# Patient Record
Sex: FEMALE | Race: WHITE | NOT HISPANIC OR LATINO | Employment: FULL TIME | URBAN - METROPOLITAN AREA
[De-identification: names, ages, dates, MRNs, and addresses within clinical notes are randomized per-mention and may not be internally consistent; named-entity substitution may affect disease eponyms.]

---
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
## 2018-01-23 ENCOUNTER — TRANSFERRED RECORDS (OUTPATIENT)
Dept: HEALTH INFORMATION MANAGEMENT | Facility: CLINIC | Age: 18
End: 2018-01-23

## 2018-01-23 LAB — PHQ9 SCORE: 12

## 2018-04-02 ENCOUNTER — TELEPHONE (OUTPATIENT)
Dept: FAMILY MEDICINE | Facility: CLINIC | Age: 18
End: 2018-04-02

## 2018-04-02 DIAGNOSIS — F41.9 ANXIETY: ICD-10-CM

## 2018-04-02 DIAGNOSIS — F32.1 MAJOR DEPRESSIVE DISORDER, SINGLE EPISODE, MODERATE (H): ICD-10-CM

## 2018-04-02 NOTE — TELEPHONE ENCOUNTER
Patient is requesting Dr Miller fill a script previously written by Dr Angie Perez = Prozac 40mg, 1qam.      Vanessa [facility]    Pharmacy Technician  [facility]

## 2018-04-02 NOTE — LETTER
April 4, 2018    Sisi Valladares  [address] [address]    Dear Sisi,       We recently received a refill request for FLUoxetine (PROZAC) 40 MG capsule.  We have refilled this for a one time 30 day supply only because you are due for a:    Anxiety/depression office visit      Please call at your earliest convenience so that there will not be a delay with your future refills.          Thank you,   Your [facility] Team/  [phone]

## 2018-04-02 NOTE — TELEPHONE ENCOUNTER
Patient is calling would like to discuss her medications and see if provider would prescribe a medication she got from hospital, patient would not disclose further. Please call to discuss. Thank you.

## 2018-04-02 NOTE — TELEPHONE ENCOUNTER
Left message on answering machine for patient to call back to [phone].  Beatrice Brothers RN

## 2018-04-03 PROBLEM — F41.9 ANXIETY: Status: ACTIVE | Noted: 2018-04-03

## 2018-04-03 RX ORDER — HYDROXYZINE PAMOATE 50 MG/1
50 CAPSULE ORAL
COMMUNITY
Start: 2018-01-25 | End: 2019-06-26

## 2018-04-03 RX ORDER — HYDROXYZINE HYDROCHLORIDE 25 MG/1
50-100 TABLET, FILM COATED ORAL 2 TIMES DAILY PRN
Qty: 60 TABLET | Refills: 0 | Status: SHIPPED | OUTPATIENT
Start: 2018-04-03 | End: 2019-06-26

## 2018-04-03 RX ORDER — TRAZODONE HYDROCHLORIDE 50 MG/1
50 TABLET, FILM COATED ORAL
COMMUNITY
Start: 2018-01-25 | End: 2019-06-26

## 2018-04-03 RX ORDER — FLUOXETINE 40 MG/1
40 CAPSULE ORAL DAILY
Qty: 30 CAPSULE | Refills: 0 | Status: SHIPPED | OUTPATIENT
Start: 2018-04-03 | End: 2019-06-26

## 2018-04-03 RX ORDER — FLUOXETINE 40 MG/1
40 CAPSULE ORAL
COMMUNITY
Start: 2018-01-26 | End: 2019-06-26

## 2018-04-03 NOTE — TELEPHONE ENCOUNTER
Patient prescribed hydroxyzine in hospital for anxiety and helps would like prescription to continue. Patient dose not know dose.  Patient was at [facility] in January for depression and anxiety.  Patient is not completely out, has a few left, therapist wants patient to continue med.    meds reconciled from care everywhere    To provider to advise.  .Joaquina TUCKER, RN, CPN

## 2018-04-04 NOTE — TELEPHONE ENCOUNTER
Pt notified of provider message as written.  Pt verbalized good understanding.  Beatrice Brothers RN

## 2018-04-17 ENCOUNTER — TRANSFERRED RECORDS (OUTPATIENT)
Dept: HEALTH INFORMATION MANAGEMENT | Facility: CLINIC | Age: 18
End: 2018-04-17

## 2018-04-17 LAB — PHQ9 SCORE: 21

## 2018-05-09 ENCOUNTER — PATIENT OUTREACH (OUTPATIENT)
Dept: CARE COORDINATION | Facility: CLINIC | Age: 18
End: 2018-05-09

## 2018-05-09 ENCOUNTER — TELEPHONE (OUTPATIENT)
Dept: FAMILY MEDICINE | Facility: CLINIC | Age: 18
End: 2018-05-09

## 2018-05-09 DIAGNOSIS — F41.9 ANXIETY: Primary | ICD-10-CM

## 2018-05-09 DIAGNOSIS — F32.A DEPRESSION: ICD-10-CM

## 2018-05-09 NOTE — LETTER
Health Care Home - Access Care Plan    About Me  Patient Name:  Sisi Valladaers    YOB: 2000  Age:                             17 year old   Apoorva MRN:            [id number] Telephone Information:     Home Phone [phone]   Mobile [phone]       [address]    [address] [facility] [address] Email address:  No e-mail address on record      Emergency Contact(s)  Name Relationship Lgl Grd Work Phone Home Phone Mobile Phone   1HEATHER JAUREGUI Father   [phone]             Health Maintenance:      My Access Plan  Medical Emergency 911   Questions or concerns during clinic hours Primary Clinic Line, I will call the clinic directly: [facility] - [phone]   24 Hour Appointment Line [phone] or  [phone]) (toll free)   24 Hour Nurse Line [phone] (toll free)   Questions or concerns outside clinic hours 24 Hour Appointment Line, I will call the after-hours on-call line:   [facility] [phone] or [phone]) (toll-free)   Preferred Urgent Care [facility], [phone]   Preferred Hospital [facility]  [phone]   Preferred Pharmacy [facility] [address], [address]     Behavioral Health Crisis Line The National Suicide Prevention Lifeline at 1-459.990.6622 or 911     My Care Team Members  Patient Care Team       Relationship Specialty Notifications Start End    Choco Miller MD PCP - General Family Practice  4/9/16     Phone: [phone] Fax: [phone] [address] [facility] [address]           My Medical and Care Information  Problem List   Patient Active Problem List   Diagnosis     Congenital hypothyroidism without goiter     Major depressive disorder, single episode, moderate (H)     Common wart     Depression     Anxiety      Current Medications and Allergies:  See printed Medication Report

## 2018-05-09 NOTE — LETTER
[facility]  [address] [facility] [address] [phone]      May 9, 2018      Sisi Valladares  [address] [facility] [address]        Dear Sisi Valladares    Your health is important to us and we missed you at your recent appointment. Please call us if you need to reschedule your appointment for another date and time.   Any time you are unable to keep your appointment we kindly ask that you call us at least two hours in advance to let us know. This will allow us to offer the time to another patient.  Our records show that this is the (second or third) appointment in the last six months that has been missed or canceled without a phone call to the clinic. Our policy allows no more than three missed appointments in a period of six months. A fourth no-show will result in our ending your care at our clinic.     If you did not attend your appointment because of concerns over your ability to pay for care, please contact me so we can discuss payment options.   If you have any questions regarding this notice, please contact me at [phone]  Sincerely,           [facility] Clinic Administrator/Manager

## 2018-05-09 NOTE — PROGRESS NOTES
Clinic Care Coordination Contact  [facility]/Voicemail    Referral Source: PCP  Clinical Data: Care Coordinator Outreach  Outreach attempted x 1.  Unable to leave message as mailbox is full.   Plan: Care Coordinator will mail out care coordination introduction letter with care coordinator contact information and explanation of care coordination services. Care Coordinator will try to reach patient again in 1-2 business days.  Martin De La Cruz RN  Clinic Care Coordinator

## 2018-05-09 NOTE — LETTER
[place] CARE COORDINATION   [facility]:   [address] [place] [address]  [phone]    May 9, 2018    Sisi Valladares  [address] [facility] [address]      Dear Sisi,    I am a clinic care coordinator who works with JOSLYN GUERRA MD at [facility]. I recently tried to call and was unable to reach you. I wanted to introduce myself and provide you with my contact information so that you can call me with questions or concerns about your health care. Below is a description of clinic care coordination and how I can further assist you.     The clinic care coordinator is a registered nurse and/or  who understand the health care system. The goal of clinic care coordination is to help you manage your health and improve access to the [facility] [place] system in the most efficient manner. The registered nurse can assist you in meeting your health care goals by providing education, coordinating services, and strengthening the communication among your providers. The  can assist you with financial, behavioral, psychosocial, chemical dependency, counseling, and/or psychiatric resources.    Please feel free to contact me at [phone], [phone], with any questions or concerns. We at [facility] are focused on providing you with the highest-quality healthcare experience possible and that all starts with you.     Sincerely,     Martin De La Cruz RN  Clinic Care Coordinator    Enclosed: I have enclosed a copy of a 24 Hour Access Plan. This has helpful phone numbers for you to call when needed. Please keep this in an easy to access place to use as needed.

## 2018-05-11 NOTE — PROGRESS NOTES
Clinic Care Coordination Contact  [facility]/Voicemail    Referral Source: PCP  Clinical Data: Care Coordinator Outreach  Outreach attempted x 2.  Unable to leave message as mailbox is full.   Plan: Care Coordinator mailed out care coordination introduction letter on 5-9-18. Care Coordinator will do no further outreaches at this time.  Martin De La Cruz RN  Clinic Care Coordinator  [phone] or [phone]

## 2019-04-05 ENCOUNTER — TRANSFERRED RECORDS (OUTPATIENT)
Dept: HEALTH INFORMATION MANAGEMENT | Facility: CLINIC | Age: 19
End: 2019-04-05

## 2019-06-26 ENCOUNTER — OFFICE VISIT (OUTPATIENT)
Dept: FAMILY MEDICINE | Facility: CLINIC | Age: 19
End: 2019-06-26
Payer: COMMERCIAL

## 2019-06-26 VITALS
WEIGHT: 138 LBS | SYSTOLIC BLOOD PRESSURE: 107 MMHG | HEIGHT: 61 IN | TEMPERATURE: 98 F | DIASTOLIC BLOOD PRESSURE: 70 MMHG | BODY MASS INDEX: 26.06 KG/M2 | HEART RATE: 76 BPM | OXYGEN SATURATION: 98 %

## 2019-06-26 DIAGNOSIS — E03.1 CONGENITAL HYPOTHYROIDISM WITHOUT GOITER: ICD-10-CM

## 2019-06-26 PROCEDURE — 99213 OFFICE O/P EST LOW 20 MIN: CPT | Performed by: FAMILY MEDICINE

## 2019-06-26 RX ORDER — LEVOTHYROXINE SODIUM 112 UG/1
112 TABLET ORAL DAILY
Qty: 90 TABLET | Refills: 3 | Status: SHIPPED | OUTPATIENT
Start: 2019-06-26 | End: 2020-11-23

## 2019-06-26 ASSESSMENT — ANXIETY QUESTIONNAIRES
1. FEELING NERVOUS, ANXIOUS, OR ON EDGE: SEVERAL DAYS
5. BEING SO RESTLESS THAT IT IS HARD TO SIT STILL: SEVERAL DAYS
GAD7 TOTAL SCORE: 9
IF YOU CHECKED OFF ANY PROBLEMS ON THIS QUESTIONNAIRE, HOW DIFFICULT HAVE THESE PROBLEMS MADE IT FOR YOU TO DO YOUR WORK, TAKE CARE OF THINGS AT HOME, OR GET ALONG WITH OTHER PEOPLE: SOMEWHAT DIFFICULT
7. FEELING AFRAID AS IF SOMETHING AWFUL MIGHT HAPPEN: NOT AT ALL
3. WORRYING TOO MUCH ABOUT DIFFERENT THINGS: SEVERAL DAYS
2. NOT BEING ABLE TO STOP OR CONTROL WORRYING: SEVERAL DAYS
6. BECOMING EASILY ANNOYED OR IRRITABLE: NEARLY EVERY DAY

## 2019-06-26 ASSESSMENT — PATIENT HEALTH QUESTIONNAIRE - PHQ9
5. POOR APPETITE OR OVEREATING: MORE THAN HALF THE DAYS
SUM OF ALL RESPONSES TO PHQ QUESTIONS 1-9: 15

## 2019-06-26 ASSESSMENT — MIFFLIN-ST. JEOR: SCORE: 1329.37

## 2019-06-26 NOTE — PROGRESS NOTES
HPI:    Sisi is a 18 year old female, here for follow-up:    Non compliance - she tells me she is ready to take care of her health.    Depression - Her psychiatric symptoms started many years ago and were low mood, low energy, low self worth, sleeping too much (10 hours per night), fatigue. She has previously thought of CO poisoning. She has previously cut her arms. She has also taken overdose of her thyroid medication to kill herself. She denies current SI and contracts for safety. She seems to have good family support from grandma and dad. In addition she has anxiety and rare panic attacks. Denies symptoms of [place] now or in the past.   Evaluation and treatment:    she was hospitalized due to suicide thoughts 4/8/16 to 4/14/16.    She had MMPI done which showed introverted person who mistrusts others (including mental health providers), feels that she does not fit in, with few coping mechanisms who is at risk for alcohol or drug  abuse. There was also mention of family conflict.   Zoloft and Prozac did not work.   Hydroxyzine helped with anxiety but causes drowsiness - not taking at this time.   She previously followed with therapist.   She does not want treatment until her thyroid is addressed - see below.    PHQ-9 SCORE 7/25/2017 12/14/2017 6/26/2019   PHQ-9 Total Score 14 9 15       BRISEIDA-7 SCORE 7/25/2017 12/14/2017 6/26/2019   Total Score 8 14 9       Congenital hypothyroidism - she had been following with pediatric endocrinology at [facility] all her life. But she is now following with us. She denies any thyroid symptoms like hair, nail changes, temperature intolerance, weight changes etc.  Evaluation and treatment:    Synthroid 112 micrograms daily - no side effects. But has not taken in a long time.   I asked her to restart it. Recheck in 6 weeks.    TSH   Date Value Ref Range Status   12/14/2017 3.04 0.40 - 4.00 mU/L Final       Low back and upper back - Previously referred to surgeon for breast  reduction to help with the back pain.    THC - she quit at the start of April 2016. She did it because everyone was asking her to do so. But she never saw it as a problem. I commended her.    Right thumb wart - we froze it before without success. Then injection worked.     Previously Overweight -   Evaluation and treatment:   Diet and exercise discussed.    Body mass index is 26.29 kg/m .      Wt Readings from Last 5 Encounters:   06/26/19 62.6 kg (138 lb)   12/14/17 67.1 kg (148 lb) (80 %)*   10/16/17 64.4 kg (142 lb) (74 %)*   07/25/17 70.3 kg (155 lb) (86 %)*   12/09/16 67.1 kg (148 lb) (82 %)*     * Growth percentiles are based on [facility] (Girls, 2-20 Years) data.       Preventive - Flu shot: declined previously.     Immunization History   Administered Date(s) Administered     DTAP (<7y) 06/29/2004     DTaP, Unspecified 02/22/1999, 06/02/1999, 07/21/1999, 08/03/2000     HepB, Unspecified 02/22/1999, 06/02/1999, 07/21/1999     Hib (PRP-T) 02/22/1999, 06/02/1999, 07/21/1999, 08/03/2000     MMR 08/03/2000, 06/29/2004     Poliovirus, inactivated (IPV) 02/22/1999, 06/02/1999, 08/03/2000, 06/29/2004     Tdap (Adacel,Boostrix) 09/08/2011     Tdap (Adult) Unspecified Formulation 09/08/2011     Varicella 06/29/2004     PAP - at age 19    Contraception - she is using condoms currently. I discussed her various options. She forgets to take pills. I advised Nexplanon and gave her brochure. She will think about it and let me know.    STD screen: negative previously. Safe sex discussed.      ROS:    Const: No fevers or night sweats recently.  ENT: No runny nose, sore throat or ear pain.  Resp: No cough or shortness of breath.  CV: No chest pain, dizziness or cardiac palpitations.  GI: No nausea, vomiting, diarrhea or constipation. Denies blood in stools or black stools.  : No dysuria, frequency or hematuria.    SH:    Marital status: monogamous with boyfriend  Kids: no  Employment: [employer] in high school  Exercise:   Tobacco:  "no  Etoh: no  Recreational drugs: per HPI  Caffeine: no    Exam:    /70 (Cuff Size: Adult Regular)   Pulse 76   Temp 98  F (36.7  C) (Oral)   Ht 1.543 m (5' 0.75\")   Wt 62.6 kg (138 lb)   SpO2 98%   Breastfeeding? No   BMI 26.29 kg/m      Gen: Healthy appearing young female in no acute distress. Here by herself.  Eyes: Conjunctiva and sclera normal. Pupils react normally to light. No nystagmus.  Neck: No enlarged lymph nodes, thyromegally or other masses.  Lungs: Good air movement and otherwise clear.  CV: Heart RRR with no murmurs. No JVD, carotid bruits or leg edema.  Psych: Affect is flat at baseline, soft spoken but cooperative. Speech is fluent. Thought logical. Insight and judgement seem to be intact. Denies SI.     Assessment and Plan - Decision Making    1. Congenital hypothyroidism without goiter    Per HPI    - levothyroxine (SYNTHROID/LEVOTHROID) 112 MCG tablet; Take 1 tablet (112 mcg) by mouth daily  Dispense: 90 tablet; Refill: 3      Written instructions given as follows:    Patient Instructions   1. Restart your thyroid medication.    2. Let me see you back in 6 weeks for follow-up with pre-visit labs.          "

## 2019-06-26 NOTE — PATIENT INSTRUCTIONS
1. Restart your thyroid medication.    2. Let me see you back in 6 weeks for follow-up with pre-visit labs.

## 2019-06-27 ASSESSMENT — ANXIETY QUESTIONNAIRES: GAD7 TOTAL SCORE: 9

## 2019-07-25 ENCOUNTER — DOCUMENTATION ONLY (OUTPATIENT)
Dept: LAB | Facility: CLINIC | Age: 19
End: 2019-07-25

## 2019-07-25 DIAGNOSIS — Z13.0 SCREENING, ANEMIA, DEFICIENCY, IRON: ICD-10-CM

## 2019-07-25 DIAGNOSIS — Z13.1 SCREENING FOR DIABETES MELLITUS: ICD-10-CM

## 2019-07-25 DIAGNOSIS — E03.1 CONGENITAL HYPOTHYROIDISM WITHOUT GOITER: Primary | ICD-10-CM

## 2019-07-26 NOTE — PROGRESS NOTES
Please review and sign Pending Pre-visit Labs in [facility]. Labs 08/07/19 and Thyroid OV 08/09/19  Yennifer HEWITT

## 2019-08-07 DIAGNOSIS — Z13.0 SCREENING, ANEMIA, DEFICIENCY, IRON: ICD-10-CM

## 2019-08-07 DIAGNOSIS — E03.1 CONGENITAL HYPOTHYROIDISM WITHOUT GOITER: ICD-10-CM

## 2019-08-07 DIAGNOSIS — Z13.1 SCREENING FOR DIABETES MELLITUS: ICD-10-CM

## 2019-08-07 LAB
ANION GAP SERPL CALCULATED.3IONS-SCNC: 6 MMOL/L (ref 3–14)
BASOPHILS # BLD AUTO: 0 10E9/L (ref 0–0.2)
BASOPHILS NFR BLD AUTO: 0.4 %
BUN SERPL-MCNC: 12 MG/DL (ref 7–30)
CALCIUM SERPL-MCNC: 8.6 MG/DL (ref 8.5–10.1)
CHLORIDE SERPL-SCNC: 110 MMOL/L (ref 94–109)
CO2 SERPL-SCNC: 25 MMOL/L (ref 20–32)
CREAT SERPL-MCNC: 0.83 MG/DL (ref 0.52–1.04)
DIFFERENTIAL METHOD BLD: NORMAL
EOSINOPHIL # BLD AUTO: 0.3 10E9/L (ref 0–0.7)
EOSINOPHIL NFR BLD AUTO: 3.6 %
ERYTHROCYTE [DISTWIDTH] IN BLOOD BY AUTOMATED COUNT: 11.9 % (ref 10–15)
GFR SERPL CREATININE-BSD FRML MDRD: >90 ML/MIN/{1.73_M2}
GLUCOSE SERPL-MCNC: 87 MG/DL (ref 70–99)
HCT VFR BLD AUTO: 38.2 % (ref 35–47)
HGB BLD-MCNC: 12.9 G/DL (ref 11.7–15.7)
LYMPHOCYTES # BLD AUTO: 2.3 10E9/L (ref 0.8–5.3)
LYMPHOCYTES NFR BLD AUTO: 31.5 %
MCH RBC QN AUTO: 32.1 PG (ref 26.5–33)
MCHC RBC AUTO-ENTMCNC: 33.8 G/DL (ref 31.5–36.5)
MCV RBC AUTO: 95 FL (ref 78–100)
MONOCYTES # BLD AUTO: 0.5 10E9/L (ref 0–1.3)
MONOCYTES NFR BLD AUTO: 7.2 %
NEUTROPHILS # BLD AUTO: 4.2 10E9/L (ref 1.6–8.3)
NEUTROPHILS NFR BLD AUTO: 57.3 %
PLATELET # BLD AUTO: 293 10E9/L (ref 150–450)
POTASSIUM SERPL-SCNC: 4.3 MMOL/L (ref 3.4–5.3)
RBC # BLD AUTO: 4.02 10E12/L (ref 3.8–5.2)
SODIUM SERPL-SCNC: 141 MMOL/L (ref 133–144)
T4 FREE SERPL-MCNC: 1.53 NG/DL (ref 0.76–1.46)
TSH SERPL DL<=0.005 MIU/L-ACNC: 12.1 MU/L (ref 0.4–4)
WBC # BLD AUTO: 7.3 10E9/L (ref 4–11)

## 2019-08-07 PROCEDURE — 84439 ASSAY OF FREE THYROXINE: CPT | Performed by: FAMILY MEDICINE

## 2019-08-07 PROCEDURE — 85025 COMPLETE CBC W/AUTO DIFF WBC: CPT | Performed by: FAMILY MEDICINE

## 2019-08-07 PROCEDURE — 36415 COLL VENOUS BLD VENIPUNCTURE: CPT | Performed by: FAMILY MEDICINE

## 2019-08-07 PROCEDURE — 80048 BASIC METABOLIC PNL TOTAL CA: CPT | Performed by: FAMILY MEDICINE

## 2019-08-07 PROCEDURE — 84443 ASSAY THYROID STIM HORMONE: CPT | Performed by: FAMILY MEDICINE

## 2019-08-09 ENCOUNTER — OFFICE VISIT (OUTPATIENT)
Dept: FAMILY MEDICINE | Facility: CLINIC | Age: 19
End: 2019-08-09
Payer: COMMERCIAL

## 2019-08-09 VITALS
DIASTOLIC BLOOD PRESSURE: 78 MMHG | HEART RATE: 87 BPM | OXYGEN SATURATION: 98 % | WEIGHT: 131 LBS | BODY MASS INDEX: 24.96 KG/M2 | TEMPERATURE: 98.3 F | SYSTOLIC BLOOD PRESSURE: 114 MMHG

## 2019-08-09 DIAGNOSIS — E03.1 CONGENITAL HYPOTHYROIDISM WITHOUT GOITER: Primary | ICD-10-CM

## 2019-08-09 DIAGNOSIS — F32.1 MAJOR DEPRESSIVE DISORDER, SINGLE EPISODE, MODERATE (H): ICD-10-CM

## 2019-08-09 PROCEDURE — 99214 OFFICE O/P EST MOD 30 MIN: CPT | Performed by: FAMILY MEDICINE

## 2019-08-09 ASSESSMENT — ANXIETY QUESTIONNAIRES
2. NOT BEING ABLE TO STOP OR CONTROL WORRYING: SEVERAL DAYS
3. WORRYING TOO MUCH ABOUT DIFFERENT THINGS: MORE THAN HALF THE DAYS
1. FEELING NERVOUS, ANXIOUS, OR ON EDGE: SEVERAL DAYS
6. BECOMING EASILY ANNOYED OR IRRITABLE: NEARLY EVERY DAY
GAD7 TOTAL SCORE: 9
7. FEELING AFRAID AS IF SOMETHING AWFUL MIGHT HAPPEN: NOT AT ALL
5. BEING SO RESTLESS THAT IT IS HARD TO SIT STILL: SEVERAL DAYS
IF YOU CHECKED OFF ANY PROBLEMS ON THIS QUESTIONNAIRE, HOW DIFFICULT HAVE THESE PROBLEMS MADE IT FOR YOU TO DO YOUR WORK, TAKE CARE OF THINGS AT HOME, OR GET ALONG WITH OTHER PEOPLE: SOMEWHAT DIFFICULT

## 2019-08-09 ASSESSMENT — PATIENT HEALTH QUESTIONNAIRE - PHQ9
SUM OF ALL RESPONSES TO PHQ QUESTIONS 1-9: 14
5. POOR APPETITE OR OVEREATING: SEVERAL DAYS

## 2019-08-09 NOTE — PROGRESS NOTES
HPI:    Sisi is a 18 year old female, here for follow-up:    Non compliance -   Evaluation and treatment:    I continue to try to motivate her using motivational interview approach.    Depression - Her psychiatric symptoms started many years ago and were low mood, low energy, low self worth, sleeping too much (10 hours per night), fatigue. She has previously thought of CO poisoning. She has previously cut her arms. She has also taken overdose of her thyroid medication to kill herself. She denies current SI and contracts for safety. She seems to have good family support from grandma and dad. In addition she has anxiety and rare panic attacks. Denies symptoms of [place] now or in the past.   Evaluation and treatment:    she was hospitalized due to suicide thoughts 4/8/16 to 4/14/16.    She had MMPI done which showed introverted person who mistrusts others (including mental health providers), feels that she does not fit in, with few coping mechanisms who is at risk for alcohol or drug  abuse. There was also mention of family conflict.   Zoloft and Prozac did not work.   Hydroxyzine helped with anxiety but causes drowsiness - not taking at this time.   She previously followed with therapist.   She does not want to go back to medications but was open to therapy - I referred her.    PHQ-9 SCORE 12/14/2017 6/26/2019 8/9/2019   PHQ-9 Total Score 9 15 14       BRISEIDA-7 SCORE 12/14/2017 6/26/2019 8/9/2019   Total Score 14 9 9       Congenital hypothyroidism - she had been following with pediatric endocrinology at [facility] all her life. But she is now following with us. She denies any thyroid symptoms like hair, nail changes, temperature intolerance, weight changes etc.  Evaluation and treatment:    Synthroid 112 micrograms daily - no side effects.    The TSH was high and the free T4 was high - this is unusual - conversion problems from T4 to T3?   I decided to continue same tx and recheck in Sept.    TSH   Date Value Ref  Range Status   08/07/2019 12.10 (H) 0.40 - 4.00 mU/L Final     T4 Free   Date Value Ref Range Status   08/07/2019 1.53 (H) 0.76 - 1.46 ng/dL Final       Low back and upper back - Previously referred to surgeon for breast reduction to help with the back pain.    THC - she was using other drugs as well. Still uses THC.  Evaluation and treatment:    I counseled her - not sure how motivated she is to quit.     Right thumb wart - we froze it before without success. Then injection worked.     Preventive -     Immunization History   Administered Date(s) Administered     DTAP (<7y) 06/29/2004     DTaP, Unspecified 02/22/1999, 06/02/1999, 07/21/1999, 08/03/2000     HepB, Unspecified 02/22/1999, 06/02/1999, 07/21/1999     Hib (PRP-T) 02/22/1999, 06/02/1999, 07/21/1999, 08/03/2000     MMR 08/03/2000, 06/29/2004     Poliovirus, inactivated (IPV) 02/22/1999, 06/02/1999, 08/03/2000, 06/29/2004     Tdap (Adacel,Boostrix) 09/08/2011     Tdap (Adult) Unspecified Formulation 09/08/2011     Varicella 06/29/2004     PAP - at age 19    Contraception - she is using condoms currently. I discussed her various options. She forgets to take pills. I advised Nexplanon and gave her brochure. She will think about it and let me know.    STD screen: negative previously. Safe sex discussed.      ROS:    Const: No fevers or night sweats recently.  ENT: No runny nose, sore throat or ear pain.  Resp: No cough or shortness of breath.  CV: No chest pain, dizziness or cardiac palpitations.  GI: No nausea, vomiting, diarrhea or constipation. Denies blood in stools or black stools.  : No dysuria, frequency or hematuria.    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  Exercise: no  Tobacco: no  Etoh: no  Recreational drugs: per HPI  Caffeine: no    Exam:    /78 (Cuff Size: Adult Regular)   Pulse 87   Temp 98.3  F (36.8  C) (Oral)   Wt 59.4 kg (131 lb)   SpO2 98%   Breastfeeding?  No   BMI 24.96 kg/m      Gen: Healthy appearing young female in no acute distress.   Eyes: Conjunctiva and sclera normal. Pupils react normally to light. No nystagmus.  Neck: No enlarged lymph nodes, thyromegally or other masses.  Lungs: Good air movement and otherwise clear.  CV: Heart RRR with no murmurs. No JVD, carotid bruits or leg edema.  Psych: Affect is flat at baseline, soft spoken but cooperative. Speech is fluent. Thought logical. Insight and judgement seem to be intact. Denies SI.     Assessment and Plan - Decision Making    1. Congenital hypothyroidism without goiter    Per HPI    - TSH; Future  - T4, free; Future  - T3, Free; Future    2. Major depressive disorder, single episode, moderate (H)    Per HPI    - MENTAL HEALTH REFERRAL  - Adult; Outpatient Treatment; Individual/Couples/Family/Group Therapy/Health Psychology; [facility] [place]: [facility] [phone]; We will contact you to schedule the appointment or please call with any questions      Written instructions given as follows:    Patient Instructions   1. Let's keep the same thyroid dose.    2. Please set up counseling - [phone].    3. Let me see you when you turn 19 with pre-visit labs.

## 2019-08-09 NOTE — LETTER
My Depression Action Plan  Name: Sisi Valladares   Date of Birth 2000  Date: 8/9/2019    My doctor: Choco Miller   My clinic: [facility]  [id number] [place] [id number] [phone]          GREEN    ZONE   Good Control    What it looks like:     Things are going generally well. You have normal up s and down s. You may even feel depressed from time to time, but bad moods usually last less than a day.   What you need to do:  1. Continue to care for yourself (see self care plan)  2. Check your depression survival kit and update it as needed  3. Follow your physician s recommendations including any medication.  4. Do not stop taking medication unless you consult with your physician first.           YELLOW         ZONE Getting Worse    What it looks like:     Depression is starting to interfere with your life.     It may be hard to get out of bed; you may be starting to isolate yourself from others.    Symptoms of depression are starting to last most all day and this has happened for several days.     You may have suicidal thoughts but they are not constant.   What you need to do:     1. Call your care team, your response to treatment will improve if you keep your care team informed of your progress. Yellow periods are signs an adjustment may need to be made.     2. Continue your self-care, even if you have to fake it!    3. Talk to someone in your support network    4. Open up your depression survival kit           RED    ZONE Medical Alert - Get Help    What it looks like:     Depression is seriously interfering with your life.     You may experience these or other symptoms: You can t get out of bed most days, can t work or engage in other necessary activities, you have trouble taking care of basic hygiene, or basic responsibilities, thoughts of suicide or death that will not go away, self-injurious behavior.     What you need to do:  1. Call your care team and request  a same-day appointment. If they are not available (weekends or after hours) call your local crisis line, emergency room or 911.            Depression Self Care Plan / Survival Kit    Self-Care for Depression  Here s the deal. Your body and mind are really not as separate as most people think.  What you do and think affects how you feel and how you feel influences what you do and think. This means if you do things that people who feel good do, it will help you feel better.  Sometimes this is all it takes.  There is also a place for medication and therapy depending on how severe your depression is, so be sure to consult with your medical provider and/ or Behavioral Health Consultant if your symptoms are worsening or not improving.     In order to better manage my stress, I will:    Exercise  Get some form of exercise, every day. This will help reduce pain and release endorphins, the  feel good  chemicals in your brain. This is almost as good as taking antidepressants!  This is not the same as joining a gym and then never going! (they count on that by the way ) It can be as simple as just going for a walk or doing some gardening, anything that will get you moving.      Hygiene   Maintain good hygiene (Get out of bed in the morning, Make your bed, Brush your teeth, Take a shower, and Get dressed like you were going to work, even if you are unemployed).  If your clothes don't fit try to get ones that do.    Diet  I will strive to eat foods that are good for me, drink plenty of water, and avoid excessive sugar, caffeine, alcohol, and other mood-altering substances.  Some foods that are helpful in depression are: complex carbohydrates, B vitamins, flaxseed, fish or fish oil, fresh fruits and vegetables.    Psychotherapy  I agree to participate in Individual Therapy (if recommended).    Medication  If prescribed medications, I agree to take them.  Missing doses can result in serious side effects.  I understand that drinking  alcohol, or other illicit drug use, may cause potential side effects.  I will not stop my medication abruptly without first discussing it with my provider.    Staying Connected With Others  I will stay in touch with my friends, family members, and my primary care provider/team.    Use your imagination  Be creative.  We all have a creative side; it doesn t matter if it s oil painting, sand castles, or mud pies! This will also kick up the endorphins.    Witness Beauty  (AKA stop and smell the roses) Take a look outside, even in mid-winter. Notice colors, textures. Watch the squirrels and birds.     Service to others  Be of service to others.  There is always someone else in need.  By helping others we can  get out of ourselves  and remember the really important things.  This also provides opportunities for practicing all the other parts of the program.    Humor  Laugh and be silly!  Adjust your TV habits for less news and crime-drama and more comedy.    Control your stress  Try breathing deep, massage therapy, biofeedback, and meditation. Find time to relax each day.     My support system    Clinic Contact:  Phone number:    Contact 1:  Phone number:    Contact 2:  Phone number:    Buddhist/:  Phone number:    Therapist:  Phone number:    Local crisis center:    Phone number:    Other community support:  Phone number:

## 2019-08-09 NOTE — PATIENT INSTRUCTIONS
1. Let's keep the same thyroid dose.    2. Please set up counseling - [phone].    3. Let me see you when you turn 19 with pre-visit labs.

## 2019-08-10 ASSESSMENT — ANXIETY QUESTIONNAIRES: GAD7 TOTAL SCORE: 9

## 2019-12-17 ENCOUNTER — MYC MEDICAL ADVICE (OUTPATIENT)
Dept: FAMILY MEDICINE | Facility: CLINIC | Age: 19
End: 2019-12-17

## 2020-02-27 ENCOUNTER — DOCUMENTATION ONLY (OUTPATIENT)
Dept: LAB | Facility: CLINIC | Age: 20
End: 2020-02-27

## 2020-02-27 NOTE — PROGRESS NOTES
On20, we sent an over due lab letter to this patient and she has not made an appt. The tests are now  and have been canceled. If you would like her to return to the clinic for a lab only appt, please have your team contact her and place new Future orders.    Thank you, Saba Richards MLT

## 2020-03-10 ENCOUNTER — HEALTH MAINTENANCE LETTER (OUTPATIENT)
Age: 20
End: 2020-03-10

## 2020-11-17 DIAGNOSIS — E03.1 CONGENITAL HYPOTHYROIDISM WITHOUT GOITER: ICD-10-CM

## 2020-11-18 NOTE — TELEPHONE ENCOUNTER
Routing refill request to provider for review/approval because:  Labs not current:  tsh  Patient needs to be seen because it has been more than 1 year since last office visit.  Beatrice TRENTN, RN

## 2020-11-23 RX ORDER — LEVOTHYROXINE SODIUM 112 UG/1
TABLET ORAL
Qty: 90 TABLET | Refills: 0 | Status: SHIPPED | OUTPATIENT
Start: 2020-11-23 | End: 2021-05-06

## 2020-12-27 ENCOUNTER — HEALTH MAINTENANCE LETTER (OUTPATIENT)
Age: 20
End: 2020-12-27

## 2021-04-24 ENCOUNTER — HEALTH MAINTENANCE LETTER (OUTPATIENT)
Age: 21
End: 2021-04-24

## 2021-05-05 ENCOUNTER — OFFICE VISIT (OUTPATIENT)
Dept: FAMILY MEDICINE | Facility: CLINIC | Age: 21
End: 2021-05-05
Payer: COMMERCIAL

## 2021-05-05 VITALS
DIASTOLIC BLOOD PRESSURE: 79 MMHG | HEIGHT: 60 IN | BODY MASS INDEX: 31.02 KG/M2 | HEART RATE: 75 BPM | TEMPERATURE: 98.7 F | SYSTOLIC BLOOD PRESSURE: 124 MMHG | OXYGEN SATURATION: 99 % | WEIGHT: 158 LBS

## 2021-05-05 DIAGNOSIS — E03.1 CONGENITAL HYPOTHYROIDISM WITHOUT GOITER: ICD-10-CM

## 2021-05-05 DIAGNOSIS — Z01.818 PREOP GENERAL PHYSICAL EXAM: Primary | ICD-10-CM

## 2021-05-05 DIAGNOSIS — N62 LARGE BREASTS: ICD-10-CM

## 2021-05-05 LAB
HGB BLD-MCNC: 12.7 G/DL (ref 11.7–15.7)
T4 FREE SERPL-MCNC: 0.69 NG/DL (ref 0.76–1.46)
TSH SERPL DL<=0.005 MIU/L-ACNC: 228.63 MU/L (ref 0.4–4)

## 2021-05-05 PROCEDURE — 84443 ASSAY THYROID STIM HORMONE: CPT | Performed by: FAMILY MEDICINE

## 2021-05-05 PROCEDURE — 85018 HEMOGLOBIN: CPT | Performed by: FAMILY MEDICINE

## 2021-05-05 PROCEDURE — 36415 COLL VENOUS BLD VENIPUNCTURE: CPT | Performed by: FAMILY MEDICINE

## 2021-05-05 PROCEDURE — 84439 ASSAY OF FREE THYROXINE: CPT | Performed by: FAMILY MEDICINE

## 2021-05-05 PROCEDURE — 99214 OFFICE O/P EST MOD 30 MIN: CPT | Performed by: FAMILY MEDICINE

## 2021-05-05 ASSESSMENT — MIFFLIN-ST. JEOR: SCORE: 1398.18

## 2021-05-05 ASSESSMENT — PAIN SCALES - GENERAL: PAINLEVEL: NO PAIN (0)

## 2021-05-05 NOTE — PROGRESS NOTES
[facility]  [address] [facility] [address]  Phone: [phone]  Primary Provider: Choco Miller  Pre-op Performing Provider: SAMANTHA MORGAN      PREOPERATIVE EVALUATION:  Today's date: 5/5/2021    Sisi Valladares is a 20 year old female who presents for a preoperative evaluation.    Surgical Information:  Surgery/Procedure: breast reduction  Surgery Location: [facility]  Surgeon: Dr. Varela  Surgery Date: 6/2  Time of Surgery: 8:00  Where patient plans to recover: At home with family  Fax number for surgical facility:     Type of Anesthesia Anticipated: General     Assessment & Plan     The proposed surgical procedure is considered INTERMEDIATE risk.    Preop general physical exam    - TSH with free T4 reflex  - Hemoglobin    Congenital hypothyroidism without goiter    - TSH with free T4 reflex    Large breasts             Risks and Recommendations:  The patient has the following additional risks and recommendations for perioperative complications:   - No identified additional risk factors other than previously addressed    Medication Instructions:  Patient is to take all scheduled medications on the day of surgery    RECOMMENDATION:  APPROVAL GIVEN to proceed with proposed procedure pending review of diagnostic evaluation.                      Subjective     HPI related to upcoming procedure: The patient has large breast that have caused multiple problems. Her bra straps dig into her skin and she also has upper back and neck pain.       Preop Questions 5/5/2021   1. Have you ever had a heart attack or stroke? No   2. Have you ever had surgery on your heart or blood vessels, such as a stent placement, a coronary artery bypass, or surgery on an artery in your head, neck, heart, or legs? No   3. Do you have chest pain with activity? No   4. Do you have a history of  heart failure? No   5. Do you currently have a cold, bronchitis or symptoms of other infection? No   6. Do you  have a cough, shortness of breath, or wheezing? No   7. Do you or anyone in your family have previous history of blood clots? No   8. Do you or does anyone in your family have a serious bleeding problem such as prolonged bleeding following surgeries or cuts? No   9. Have you ever had problems with anemia or been told to take iron pills? No   10. Have you had any abnormal blood loss such as black, tarry or bloody stools, or abnormal vaginal bleeding? No   11. Have you ever had a blood transfusion? No   12. Are you willing to have a blood transfusion if it is medically needed before, during, or after your surgery? Yes   13. Have you or any of your relatives ever had problems with anesthesia? No   14. Do you have sleep apnea, excessive snoring or daytime drowsiness? No   15. Do you have any artifical heart valves or other implanted medical devices like a pacemaker, defibrillator, or continuous glucose monitor? No   16. Do you have artificial joints? No   17. Are you allergic to latex? No   18. Is there any chance that you may be pregnant? No     Health Care Directive:  Patient does not have a Health Care Directive or Living Will: Discussed advance care planning with patient; information given to patient to review.    Preoperative Review of :   reviewed - no record of controlled substances prescribed.      Status of Chronic Conditions:  HYPOTHYROIDISM - Patient has a longstanding history of chronic Hypothyroidism since birth.   She reports that she is not very good at taking her medication daily.   She has taken it daily for the last week.   Prior to that she misses doses several times a week.   She has not had a TSH for at least a year.       Lab Results   Component Value Date    TSH 12.10 (H) 08/07/2019       Review of Systems  CONSTITUTIONAL: NEGATIVE for fever, chills, change in weight  INTEGUMENTARY/SKIN: NEGATIVE for worrisome rashes, moles or lesions  EYES: NEGATIVE for vision changes or  irritation  ENT/MOUTH: NEGATIVE for ear, mouth and throat problems  RESP: NEGATIVE for significant cough or SOB  BREAST: NEGATIVE for masses, tenderness or discharge  CV: NEGATIVE for chest pain, palpitations or peripheral edema  GI: NEGATIVE for nausea, abdominal pain, heartburn, or change in bowel habits  : NEGATIVE for frequency, dysuria, or hematuria She has last had intercourse 1 year ago.  MUSCULOSKELETAL: NEGATIVE for significant arthralgias or myalgia  NEURO: NEGATIVE for weakness, dizziness or paresthesias  ENDOCRINE: NEGATIVE for temperature intolerance, skin/hair changes  HEME: NEGATIVE for bleeding problems  PSYCHIATRIC: NEGATIVE for changes in mood or affect    Patient Active Problem List    Diagnosis Date Noted     Anxiety 04/03/2018     Priority: Medium     Depression 04/08/2016     Priority: Medium     Common wart 02/11/2016     Priority: Medium     Major depressive disorder, single episode, moderate (H) 12/28/2015     Priority: Medium     Congenital hypothyroidism without goiter 12/01/2015     Priority: Medium      Past Medical History:   Diagnosis Date     Depressive disorder      Thyroid disease      Past Surgical History:   Procedure Laterality Date     NO HISTORY OF SURGERY       Current Outpatient Medications   Medication Sig Dispense Refill     levothyroxine (SYNTHROID/LEVOTHROID) 112 MCG tablet TAKE ONE TABLET BY MOUTH ONCE DAILY 90 tablet 0       No Known Allergies     Social History     Tobacco Use     Smoking status: Former Smoker     Packs/day: 1.00     Years: 1.00     Pack years: 1.00     Types: Cigarettes     Smokeless tobacco: Never Used     Tobacco comment: 1 pack a week per pt   Substance Use Topics     Alcohol use: No     History reviewed. No pertinent family history.  History   Drug Use     Types: Marijuana     Comment: almost daily use         Objective     There were no vitals taken for this visit.    Physical Exam    GENERAL APPEARANCE: healthy, alert and no distress     EYES:  EOMI, PERRL     HENT: ear canals and TM's normal and nose and mouth without ulcers or lesions     NECK: no adenopathy, no asymmetry, masses, or scars and thyroid normal to palpation     RESP: lungs clear to auscultation - no rales, rhonchi or wheezes     CV: regular rates and rhythm, normal S1 S2, no S3 or S4 and no murmur, click or rub     ABDOMEN:  soft, nontender, no HSM or masses and bowel sounds normal     MS: extremities normal- no gross deformities noted, no evidence of inflammation in joints, FROM in all extremities.     SKIN: no suspicious lesions or rashes     NEURO: Normal strength and tone, sensory exam grossly normal, mentation intact and speech normal     PSYCH: mentation appears normal. and affect normal/bright     LYMPHATICS: No cervical adenopathy    Recent Labs   Lab Test 08/07/19  0857   HGB 12.9         POTASSIUM 4.3   CR 0.83        Diagnostics:  Labs pending at this time.  Results will be reviewed when available.  Recent Results (from the past 24 hour(s))   Hemoglobin    Collection Time: 05/05/21  1:07 PM   Result Value Ref Range    Hemoglobin 12.7 11.7 - 15.7 g/dL      No EKG required, no history of coronary heart disease, significant arrhythmia, peripheral arterial disease or other structural heart disease.    Revised Cardiac Risk Index (RCRI):  The patient has the following serious cardiovascular risks for perioperative complications:   - No serious cardiac risks = 0 points     RCRI Interpretation: 0 points: Class I (very low risk - 0.4% complication rate)           Signed Electronically by: Brandyn Oates MD  Copy of this evaluation report is provided to requesting physician.

## 2021-05-05 NOTE — NURSING NOTE
Chief Complaint   Patient presents with     Pre-Op Exam       Initial /79   Pulse 75   Temp 98.7  F (37.1  C) (Tympanic)   Ht 1.524 m (5')   Wt 71.7 kg (158 lb)   SpO2 99%   BMI 30.86 kg/m   Estimated body mass index is 30.86 kg/m  as calculated from the following:    Height as of this encounter: 1.524 m (5').    Weight as of this encounter: 71.7 kg (158 lb).  Medication Reconciliation: complete  Corinne Carrington, CMA

## 2021-05-05 NOTE — PATIENT INSTRUCTIONS

## 2021-05-06 DIAGNOSIS — E03.1 CONGENITAL HYPOTHYROIDISM WITHOUT GOITER: ICD-10-CM

## 2021-05-06 RX ORDER — LEVOTHYROXINE SODIUM 112 UG/1
112 TABLET ORAL DAILY
Qty: 90 TABLET | Refills: 0 | Status: SHIPPED | OUTPATIENT
Start: 2021-05-06 | End: 2023-04-27

## 2021-10-04 ENCOUNTER — HEALTH MAINTENANCE LETTER (OUTPATIENT)
Age: 21
End: 2021-10-04

## 2022-05-10 NOTE — NURSING NOTE
"Chief Complaint   Patient presents with     Thyroid Problem     F/U Thyroid       Initial BP 91/70  Pulse 61  Temp 97.4  F (36.3  C) (Oral)  Ht 5' 1\" (1.549 m)  Wt 155 lb (70.3 kg)  SpO2 99%  Breastfeeding? No  BMI 29.29 kg/m2 Estimated body mass index is 29.29 kg/(m^2) as calculated from the following:    Height as of this encounter: 5' 1\" (1.549 m).    Weight as of this encounter: 155 lb (70.3 kg).  Medication Reconciliation: complete      Lisa Gonzalez MA    " Let patient know that her phentermine was denied. She should be able to pay out of pocket for this med.

## 2022-05-15 ENCOUNTER — HEALTH MAINTENANCE LETTER (OUTPATIENT)
Age: 22
End: 2022-05-15

## 2022-09-11 ENCOUNTER — HEALTH MAINTENANCE LETTER (OUTPATIENT)
Age: 22
End: 2022-09-11

## 2023-04-21 NOTE — PROGRESS NOTES
HPI:    Sisi is a 22 year old female, here for follow-up:    Non compliance -   Evaluation and treatment:    I have not seen Sisi in several years.   Hopefully as she matures, she will be more compliant.    Depression - Her psychiatric symptoms started in her teen years. Symptoms were low mood, low energy, low self worth, sleeping too much (10 hours per night), fatigue. She has previously thought of CO poisoning. She has previously cut her arms. She has also taken overdose of her thyroid medication to kill herself. She denies current SI. She seems to have good family support. In addition she has anxiety and rare panic attacks. Denies symptoms of [place] now or in the past.   Evaluation and treatment:    she was hospitalized due to suicide thoughts 4/8/16 to 4/14/16.    She had MMPI done which showed introverted person who mistrusts others (including mental health providers), feels that she does not fit in, with few coping mechanisms who is at risk for alcohol or drug abuse. There was also mention of family conflict.   Zoloft and Prozac did not work.   Hydroxyzine helped with anxiety but causes drowsiness - not taking at this time.   She previously followed with therapist.   She feels things are ok now and does not want any treatment.         6/26/2019     3:57 PM 8/9/2019    12:44 PM 4/27/2023    11:03 AM   PHQ-9 SCORE   PHQ-9 Total Score MyChart   4 (Minimal depression)   PHQ-9 Total Score 15 14 4           12/14/2017     1:18 PM 6/26/2019     3:57 PM 8/9/2019    12:44 PM   BRISEIDA-7 SCORE   Total Score 14 9 9       Congenital hypothyroidism - she had been following with pediatric endocrinology at [facility] but started following with us in her late teens. She denies any thyroid symptoms like hair, nail changes, temperature intolerance, weight changes etc.  Evaluation and treatment:    Synthroid 112 micrograms daily - no side effects. She has not been taking for no specific reason.   At this time, we are not  checking labs - I asked her to restart her Synthroid and recheck labs in about 6 weeks.     TSH   Date Value Ref Range Status   05/05/2021 228.63 (H) 0.40 - 4.00 mU/L Final     T4 Free   Date Value Ref Range Status   05/05/2021 0.69 (L) 0.76 - 1.46 ng/dL Final       Previous right thumb wart - we froze it before without success. Then injection worked.     Preventive -     Immunization History   Administered Date(s) Administered     COVID-19 Vaccine 12+ (Pfizer) 08/24/2021, 09/14/2021     DTAP (<7y) 06/29/2004     DTaP, Unspecified 02/22/1999, 06/02/1999, 07/21/1999, 08/03/2000     HIB (PRP-T) 02/22/1999, 06/02/1999, 07/21/1999, 08/03/2000     HepB, Unspecified 02/22/1999, 06/02/1999, 07/21/1999     MMR 08/03/2000, 06/29/2004     Poliovirus, inactivated (IPV) 02/22/1999, 06/02/1999, 08/03/2000, 06/29/2004     TDAP (Adacel,Boostrix) 09/08/2011     Tdap (Adult) Unspecified Formulation 09/08/2011     Varicella 06/29/2004     PAP - she is due for this but we did not discuss today.    Contraception - she is using condoms currently. I discussed her various options. She forgets to take pills. I previous advised Nexplanon and gave her brochure.    STD screen/genital warts: at the end of our visit today, she described what sound like genital warts. She did not feel comfortable with exam today so I referred her to GYN. I ordered the following tests which were all negative.    Results for orders placed or performed in visit on 04/27/23   HIV Antigen Antibody Combo     Status: Normal   Result Value Ref Range    HIV Antigen Antibody Combo Nonreactive Nonreactive   Hepatitis C Screen Reflex to HCV RNA Quant and Genotype     Status: Normal   Result Value Ref Range    Hepatitis C Antibody Nonreactive Nonreactive    Narrative    Assay performance characteristics have not been established for newborns, infants, and children.   Neisseria gonorrhoeae PCR     Status: Normal    Specimen: Urine, Voided   Result Value Ref Range    Neisseria  "gonorrhoeae Negative Negative   Chlamydia trachomatis PCR     Status: Normal    Specimen: Urine, Voided   Result Value Ref Range    Chlamydia trachomatis Negative Negative   Wet prep - lab collect     Status: Abnormal    Specimen: Vagina; Swab   Result Value Ref Range    Trichomonas Absent Absent    Yeast Absent Absent    Clue Cells Absent Absent    WBCs/high power field 1+ (A) None       Social History     Socioeconomic History     Marital status: Single     Spouse name: Not on file     Number of children: Not on file     Years of education: Not on file     Highest education level: Not on file   Occupational History     Not on file   Tobacco Use     Smoking status: Former     Packs/day: 1.00     Years: 1.00     Pack years: 1.00     Types: Cigarettes     Smokeless tobacco: Never     Tobacco comments:     1 pack a week per pt   Vaping Use     Vaping status: Not on file   Substance and Sexual Activity     Alcohol use: No     Drug use: Yes     Types: Marijuana     Comment: almost daily use     Sexual activity: Never   Other Topics Concern     Parent/sibling w/ CABG, MI or angioplasty before 65F 55M? Not Asked   Social History Narrative     Not on file     Social Determinants of Health     Financial Resource Strain: Not on file   Food Insecurity: Not on file   Transportation Needs: Not on file   Physical Activity: Not on file   Stress: Not on file   Social Connections: Not on file   Intimate Partner Violence: Not on file   Housing Stability: Not on file         Exam:    /79   Pulse 82   Temp 97.7  F (36.5  C) (Tympanic)   Resp 14   Ht 1.549 m (5' 1\")   Wt 72.1 kg (159 lb)   SpO2 99%   Breastfeeding No   BMI 30.04 kg/m      Gen: Healthy appearing young female in no acute distress.   Psych: Affect is flat at baseline, soft spoken but cooperative. Speech is fluent. Thought logical. Insight and judgement seem to be intact. Denies SI.     Assessment and Plan - Decision Making    1. Congenital hypothyroidism " without goiter    Per HPI    - levothyroxine (SYNTHROID/LEVOTHROID) 112 MCG tablet; Take 1 tablet (112 mcg) by mouth daily  Dispense: 90 tablet; Refill: 0    2. Genital warts    Per HPI    - Ob/Gyn Referral; Future    3. Screen for STD (sexually transmitted disease)    Per HPI    - Neisseria gonorrhoeae PCR  - Chlamydia trachomatis PCR  - HIV Antigen Antibody Combo  - Hepatitis C Screen Reflex to HCV RNA Quant and Genotype  - Wet prep - lab collect      Written instructions given as follows:    Patient Instructions   1. Restart your thyroid medication.    2.   I set you up to see TANA Khan on 6/12/23.    It was a pleasure being your doctor over the years.

## 2023-04-27 ENCOUNTER — OFFICE VISIT (OUTPATIENT)
Dept: FAMILY MEDICINE | Facility: CLINIC | Age: 23
End: 2023-04-27
Payer: COMMERCIAL

## 2023-04-27 VITALS
WEIGHT: 159 LBS | OXYGEN SATURATION: 99 % | BODY MASS INDEX: 30.02 KG/M2 | RESPIRATION RATE: 14 BRPM | DIASTOLIC BLOOD PRESSURE: 79 MMHG | HEART RATE: 82 BPM | HEIGHT: 61 IN | TEMPERATURE: 97.7 F | SYSTOLIC BLOOD PRESSURE: 114 MMHG

## 2023-04-27 DIAGNOSIS — Z11.3 SCREEN FOR STD (SEXUALLY TRANSMITTED DISEASE): ICD-10-CM

## 2023-04-27 DIAGNOSIS — A63.0 GENITAL WARTS: Primary | ICD-10-CM

## 2023-04-27 DIAGNOSIS — E03.1 CONGENITAL HYPOTHYROIDISM WITHOUT GOITER: ICD-10-CM

## 2023-04-27 LAB
CLUE CELLS: ABNORMAL
TRICHOMONAS, WET PREP: ABNORMAL
WBC'S/HIGH POWER FIELD, WET PREP: ABNORMAL
YEAST, WET PREP: ABNORMAL

## 2023-04-27 PROCEDURE — 87389 HIV-1 AG W/HIV-1&-2 AB AG IA: CPT | Performed by: FAMILY MEDICINE

## 2023-04-27 PROCEDURE — 87210 SMEAR WET MOUNT SALINE/INK: CPT | Performed by: FAMILY MEDICINE

## 2023-04-27 PROCEDURE — 87591 N.GONORRHOEAE DNA AMP PROB: CPT | Performed by: FAMILY MEDICINE

## 2023-04-27 PROCEDURE — 87491 CHLMYD TRACH DNA AMP PROBE: CPT | Performed by: FAMILY MEDICINE

## 2023-04-27 PROCEDURE — 36415 COLL VENOUS BLD VENIPUNCTURE: CPT | Performed by: FAMILY MEDICINE

## 2023-04-27 PROCEDURE — 99214 OFFICE O/P EST MOD 30 MIN: CPT | Performed by: FAMILY MEDICINE

## 2023-04-27 PROCEDURE — 86803 HEPATITIS C AB TEST: CPT | Performed by: FAMILY MEDICINE

## 2023-04-27 RX ORDER — LEVOTHYROXINE SODIUM 112 UG/1
112 TABLET ORAL DAILY
Qty: 90 TABLET | Refills: 0 | Status: SHIPPED | OUTPATIENT
Start: 2023-04-27 | End: 2024-04-25

## 2023-04-27 ASSESSMENT — PATIENT HEALTH QUESTIONNAIRE - PHQ9
10. IF YOU CHECKED OFF ANY PROBLEMS, HOW DIFFICULT HAVE THESE PROBLEMS MADE IT FOR YOU TO DO YOUR WORK, TAKE CARE OF THINGS AT HOME, OR GET ALONG WITH OTHER PEOPLE: NOT DIFFICULT AT ALL
SUM OF ALL RESPONSES TO PHQ QUESTIONS 1-9: 4
SUM OF ALL RESPONSES TO PHQ QUESTIONS 1-9: 4

## 2023-04-27 ASSESSMENT — PAIN SCALES - GENERAL: PAINLEVEL: NO PAIN (0)

## 2023-04-27 NOTE — RESULT ENCOUNTER NOTE
Ana Laird,    The sample you collected did not show yeast, trichomonas or bacterial vaginosis.    I will send the rest of the results as they become available.    Regards,    Choco Miller M.D.

## 2023-04-27 NOTE — PATIENT INSTRUCTIONS
Restart your thyroid medication.    2.   I set you up to see TANA Khan on 6/12/23.    It was a pleasure being your doctor over the years.

## 2023-04-28 LAB
C TRACH DNA SPEC QL NAA+PROBE: NEGATIVE
HCV AB SERPL QL IA: NONREACTIVE
HIV 1+2 AB+HIV1 P24 AG SERPL QL IA: NONREACTIVE
N GONORRHOEA DNA SPEC QL NAA+PROBE: NEGATIVE

## 2023-06-01 ENCOUNTER — OFFICE VISIT (OUTPATIENT)
Dept: OBGYN | Facility: CLINIC | Age: 23
End: 2023-06-01
Payer: COMMERCIAL

## 2023-06-01 VITALS
DIASTOLIC BLOOD PRESSURE: 76 MMHG | SYSTOLIC BLOOD PRESSURE: 111 MMHG | BODY MASS INDEX: 30.06 KG/M2 | HEIGHT: 61 IN | WEIGHT: 159.2 LBS | HEART RATE: 87 BPM | OXYGEN SATURATION: 98 %

## 2023-06-01 DIAGNOSIS — Z01.411 ENCOUNTER FOR GYNECOLOGICAL EXAMINATION WITH ABNORMAL FINDING: Primary | ICD-10-CM

## 2023-06-01 DIAGNOSIS — A63.0 GENITAL WARTS: ICD-10-CM

## 2023-06-01 PROCEDURE — 99385 PREV VISIT NEW AGE 18-39: CPT | Performed by: NURSE PRACTITIONER

## 2023-06-01 PROCEDURE — G0124 SCREEN C/V THIN LAYER BY MD: HCPCS | Performed by: PATHOLOGY

## 2023-06-01 PROCEDURE — G0145 SCR C/V CYTO,THINLAYER,RESCR: HCPCS | Performed by: NURSE PRACTITIONER

## 2023-06-01 RX ORDER — IMIQUIMOD 12.5 MG/.25G
CREAM TOPICAL
Qty: 12 PACKET | Refills: 3 | Status: SHIPPED | OUTPATIENT
Start: 2023-06-01 | End: 2024-05-28

## 2023-06-01 ASSESSMENT — ENCOUNTER SYMPTOMS
SHORTNESS OF BREATH: 0
PALPITATIONS: 0
MYALGIAS: 0
NERVOUS/ANXIOUS: 1
HEADACHES: 1
HEMATURIA: 0
PARESTHESIAS: 0
BREAST MASS: 0
ARTHRALGIAS: 0
DIZZINESS: 0
CHILLS: 0
HEMATOCHEZIA: 0
DYSURIA: 0
FEVER: 0
ABDOMINAL PAIN: 0
EYE PAIN: 0
COUGH: 0
NAUSEA: 0
FREQUENCY: 0
CONSTIPATION: 0
HEARTBURN: 0
WEAKNESS: 0
DIARRHEA: 0
JOINT SWELLING: 0
SORE THROAT: 0

## 2023-06-01 NOTE — PROGRESS NOTES
SUBJECTIVE:   CC: Sisi is an 22 year old who presents for preventive health visit.     Healthy Habits:     Getting at least 3 servings of Calcium per day:  Yes    Bi-annual eye exam:  NO    Dental care twice a year:  Yes    Sleep apnea or symptoms of sleep apnea:  None    Diet:  Regular (no restrictions)    Frequency of exercise:  None    Taking medications regularly:  No    Barriers to taking medications:  Problems remembering to take them    Medication side effects:  None    PHQ-2 Total Score: 0    Additional concerns today:  No    Patient restarted on Synthroid at her visit in April, has follow up appointment in 2 weeks, planning to get labs done just prior to visit.    At visit in April, had mentioned to provider she thought she had genital warts and had declined an exam, was referred to GYN for evaluation.      Today's PHQ-2 Score:       6/1/2023     8:22 AM   PHQ-2 ( 1999 Pfizer)   Q1: Little interest or pleasure in doing things 0   Q2: Feeling down, depressed or hopeless 0   PHQ-2 Score 0   Q1: Little interest or pleasure in doing things Not at all   Q2: Feeling down, depressed or hopeless Not at all   PHQ-2 Score 0           Social History     Tobacco Use     Smoking status: Former     Types: Cigarettes     Smokeless tobacco: Never   Vaping Use     Vaping status: Not on file   Substance Use Topics     Alcohol use: No             6/1/2023     8:22 AM   Alcohol Use   Prescreen: >3 drinks/day or >7 drinks/week? No     Reviewed orders with patient.  Reviewed health maintenance and updated orders accordingly - Yes  Patient Active Problem List   Diagnosis     Congenital hypothyroidism without goiter     Major depressive disorder, single episode, moderate (H)     Common wart     Depression     Anxiety     Past Surgical History:   Procedure Laterality Date     NO HISTORY OF SURGERY         Social History     Tobacco Use     Smoking status: Former     Types: Cigarettes     Smokeless tobacco: Never   Vaping Use  "    Vaping status: Not on file   Substance Use Topics     Alcohol use: No     History reviewed. No pertinent family history.        History of abnormal Pap smear: NO - age 21-29 PAP every 3 years recommended     Reviewed and updated as needed this visit by clinical staff   Tobacco  Allergies  Meds   Med Hx  Surg Hx  Fam Hx  Soc Hx        Reviewed and updated as needed this visit by Provider                 Past Medical History:   Diagnosis Date     Depressive disorder      Thyroid disease       Past Surgical History:   Procedure Laterality Date     NO HISTORY OF SURGERY         Review of Systems  CONSTITUTIONAL: NEGATIVE for fever, chills, change in weight  INTEGUMENTARU/SKIN: NEGATIVE for worrisome rashes, moles or lesions  EYES: NEGATIVE for vision changes or irritation  ENT: NEGATIVE for ear, mouth and throat problems  RESP: NEGATIVE for significant cough or SOB  BREAST: NEGATIVE for masses, tenderness or discharge  CV: NEGATIVE for chest pain, palpitations or peripheral edema  GI: NEGATIVE for nausea, abdominal pain, heartburn, or change in bowel habits  : NEGATIVE for unusual urinary or vaginal symptoms-except see above for wart concerns. Periods are regular.  MUSCULOSKELETAL: NEGATIVE for significant arthralgias or myalgia  NEURO: NEGATIVE for weakness, dizziness or paresthesias  PSYCHIATRIC: NEGATIVE for changes in mood or affect     OBJECTIVE:   /76 (BP Location: Right arm, Patient Position: Sitting, Cuff Size: Adult Regular)   Pulse 87   Ht 1.549 m (5' 1\")   Wt 72.2 kg (159 lb 3.2 oz)   LMP 05/04/2023 (Approximate)   SpO2 98%   BMI 30.08 kg/m    Physical Exam  GENERAL: healthy, alert and no distress  NECK: no adenopathy, no asymmetry, masses, or scars and thyroid normal to palpation  RESP: lungs clear to auscultation - no rales, rhonchi or wheezes  BREAST: normal without masses, tenderness or nipple discharge and no palpable axillary masses or adenopathy  CV: regular rate and rhythm, " "normal S1 S2, no S3 or S4, no murmur, click or rub, no peripheral edema and peripheral pulses strong  ABDOMEN: soft, nontender, no hepatosplenomegaly, no masses and bowel sounds normal   (female): normal female external genitalia, normal urethral meatus , vaginal mucosa pink, moist, well rugated, normal cervix, adnexae, and uterus without masses. Towards the rectum, there is a small cluster of several small, flesh colored, slightly raised warts  MS: no gross musculoskeletal defects noted, no edema  SKIN: no suspicious lesions or rashes  NEURO: Normal strength and tone, mentation intact and speech normal  PSYCH: mentation appears normal, affect normal/bright    ASSESSMENT/PLAN:   (Z01.419) Encounter for gynecological examination without abnormal finding  (primary encounter diagnosis)  Plan: Pap imaged thin layer screen only - recommended        age 21 - 24 years         (A63.0) Genital warts  Comment: Discussed exam findings with patient. Discussed treatment in clinic with cryotherapy or TCA. Patient is nervous to try either management option. We discussed warts including transmission and recommendation for treatment. She is willing to try Aldara. Discussed how this is different than cryotherapy, TCA. Discussed this may take longer to manage the outbreak. Discussed use and patient desires to try. Prescription sent, encouraged to think about other treatment options and return to clinic if desired.  Plan: imiquimod (ALDARA) 5 % external cream          COUNSELING:  Reviewed preventive health counseling, as reflected in patient instructions  Special attention given to:        Regular exercise       Healthy diet/nutrition       Contraception       Safe sex practices/STD prevention      BMI:   Estimated body mass index is 30.08 kg/m  as calculated from the following:    Height as of this encounter: 1.549 m (5' 1\").    Weight as of this encounter: 72.2 kg (159 lb 3.2 oz).   Weight management plan: Discussed healthy diet " and exercise guidelines      She reports that she has quit smoking. Her smoking use included cigarettes. She has never used smokeless tobacco.          VIOLETTA Mojica CNP [facility]

## 2023-06-01 NOTE — PATIENT INSTRUCTIONS
If you have any questions regarding your visit, Please contact your care team.     [facility] Services: [phone]  To Schedule an Appointment 24/7  Call: [phone]     Leesa Graf- APRN CNP      Karla Pillai-Surgery Scheduler  Fouzia-Surgery Scheduler         Monday 7:30 am-5:00 pm    Tuesday 8:00 am-4:00 pm    Wednesday 7:30 am-4:00 pm    Thursday 8:00 am-11:00 am    Friday 7:30 am-4:00 pm [address] [address] [phone] ask for [facility]  [phone] Fax    Imaging Scheduling all locations  [phone]    [facility] Labor and Delivery  [address] [place] [address]   [place] [id number] [phone]         Urgent Care locations:  [facility]   Monday-Friday  10 am - 8 pm  Saturday and Sunday   9 am - 5 pm     [phone]   If you need a medication refill, please contact your pharmacy. Please allow 3 business days for your refill to be completed.  As always, Thank you for trusting us with your healthcare needs!      see additional instructions from your care team below  \

## 2023-06-06 LAB
BKR LAB AP GYN ADEQUACY: ABNORMAL
BKR LAB AP GYN INTERPRETATION: ABNORMAL
BKR LAB AP HPV REFLEX: NO
BKR LAB AP PREVIOUS ABNORMAL: ABNORMAL
PATH REPORT.COMMENTS IMP SPEC: ABNORMAL
PATH REPORT.COMMENTS IMP SPEC: ABNORMAL
PATH REPORT.RELEVANT HX SPEC: ABNORMAL

## 2023-06-07 ENCOUNTER — PATIENT OUTREACH (OUTPATIENT)
Dept: OBGYN | Facility: CLINIC | Age: 23
End: 2023-06-07
Payer: COMMERCIAL

## 2023-12-20 ENCOUNTER — TELEPHONE (OUTPATIENT)
Dept: FAMILY MEDICINE | Facility: CLINIC | Age: 23
End: 2023-12-20
Payer: COMMERCIAL

## 2023-12-20 NOTE — TELEPHONE ENCOUNTER
Patient Quality Outreach    Patient is due for the following:   Depression  -  PHQ-9 needed      Topic Date Due    Hepatitis B Vaccine (4 of 4 - 4-dose series) 07/28/1999    HPV Vaccine (1 - 2-dose series) Never done    Diptheria Tetanus Pertussis (DTAP/TDAP/TD) Vaccine (8 - Td or Tdap) 09/08/2021    Flu Vaccine (1) Never done    COVID-19 Vaccine (3 - 2023-24 season) 09/01/2023       Next Steps:   Schedule a nurse only visit for immunizations.    Type of outreach:    Sent Thingies message.      Questions for provider review:    None           TAYLOR JOLLY MA

## 2024-04-25 ENCOUNTER — OFFICE VISIT (OUTPATIENT)
Dept: FAMILY MEDICINE | Facility: CLINIC | Age: 24
End: 2024-04-25
Payer: COMMERCIAL

## 2024-04-25 VITALS
WEIGHT: 168 LBS | RESPIRATION RATE: 16 BRPM | OXYGEN SATURATION: 99 % | BODY MASS INDEX: 31.72 KG/M2 | DIASTOLIC BLOOD PRESSURE: 80 MMHG | SYSTOLIC BLOOD PRESSURE: 120 MMHG | TEMPERATURE: 97 F | HEIGHT: 61 IN | HEART RATE: 71 BPM

## 2024-04-25 DIAGNOSIS — F41.9 ANXIETY: ICD-10-CM

## 2024-04-25 DIAGNOSIS — R79.89 ELEVATED SERUM CREATININE: ICD-10-CM

## 2024-04-25 DIAGNOSIS — E03.1 CONGENITAL HYPOTHYROIDISM WITHOUT GOITER: Primary | ICD-10-CM

## 2024-04-25 DIAGNOSIS — F32.1 MAJOR DEPRESSIVE DISORDER, SINGLE EPISODE, MODERATE (H): ICD-10-CM

## 2024-04-25 LAB
BASOPHILS # BLD AUTO: 0.1 10E3/UL (ref 0–0.2)
BASOPHILS NFR BLD AUTO: 1 %
EOSINOPHIL # BLD AUTO: 0.1 10E3/UL (ref 0–0.7)
EOSINOPHIL NFR BLD AUTO: 2 %
ERYTHROCYTE [DISTWIDTH] IN BLOOD BY AUTOMATED COUNT: 13.1 % (ref 10–15)
HCT VFR BLD AUTO: 35.7 % (ref 35–47)
HGB BLD-MCNC: 12 G/DL (ref 11.7–15.7)
IMM GRANULOCYTES # BLD: 0 10E3/UL
IMM GRANULOCYTES NFR BLD: 0 %
LYMPHOCYTES # BLD AUTO: 2 10E3/UL (ref 0.8–5.3)
LYMPHOCYTES NFR BLD AUTO: 28 %
MCH RBC QN AUTO: 32.4 PG (ref 26.5–33)
MCHC RBC AUTO-ENTMCNC: 33.6 G/DL (ref 31.5–36.5)
MCV RBC AUTO: 97 FL (ref 78–100)
MONOCYTES # BLD AUTO: 0.5 10E3/UL (ref 0–1.3)
MONOCYTES NFR BLD AUTO: 7 %
NEUTROPHILS # BLD AUTO: 4.5 10E3/UL (ref 1.6–8.3)
NEUTROPHILS NFR BLD AUTO: 63 %
PLATELET # BLD AUTO: 305 10E3/UL (ref 150–450)
RBC # BLD AUTO: 3.7 10E6/UL (ref 3.8–5.2)
WBC # BLD AUTO: 7.2 10E3/UL (ref 4–11)

## 2024-04-25 PROCEDURE — 84443 ASSAY THYROID STIM HORMONE: CPT | Performed by: PHYSICIAN ASSISTANT

## 2024-04-25 PROCEDURE — 36415 COLL VENOUS BLD VENIPUNCTURE: CPT | Performed by: PHYSICIAN ASSISTANT

## 2024-04-25 PROCEDURE — 85025 COMPLETE CBC W/AUTO DIFF WBC: CPT | Performed by: PHYSICIAN ASSISTANT

## 2024-04-25 PROCEDURE — 99213 OFFICE O/P EST LOW 20 MIN: CPT | Performed by: PHYSICIAN ASSISTANT

## 2024-04-25 PROCEDURE — 80048 BASIC METABOLIC PNL TOTAL CA: CPT | Performed by: PHYSICIAN ASSISTANT

## 2024-04-25 PROCEDURE — 84439 ASSAY OF FREE THYROXINE: CPT | Performed by: PHYSICIAN ASSISTANT

## 2024-04-25 RX ORDER — LEVOTHYROXINE SODIUM 112 UG/1
112 TABLET ORAL DAILY
Qty: 90 TABLET | Refills: 0 | Status: SHIPPED | OUTPATIENT
Start: 2024-04-25

## 2024-04-25 ASSESSMENT — PATIENT HEALTH QUESTIONNAIRE - PHQ9
SUM OF ALL RESPONSES TO PHQ QUESTIONS 1-9: 19
SUM OF ALL RESPONSES TO PHQ QUESTIONS 1-9: 19
10. IF YOU CHECKED OFF ANY PROBLEMS, HOW DIFFICULT HAVE THESE PROBLEMS MADE IT FOR YOU TO DO YOUR WORK, TAKE CARE OF THINGS AT HOME, OR GET ALONG WITH OTHER PEOPLE: SOMEWHAT DIFFICULT

## 2024-04-25 ASSESSMENT — PAIN SCALES - GENERAL: PAINLEVEL: NO PAIN (0)

## 2024-04-25 NOTE — PROGRESS NOTES
"  Assessment & Plan     (E03.1) Congenital hypothyroidism without goiter  (primary encounter diagnosis)  Comment: Patient presents for discussion of thyroid medications.  Intermittent use of levothyroxine with history of congenital hypothyroid does not.  Patient states has been irregular with medications as she forgets to take them and is not often medication compliant.  Notes that her mood has more depressive affect with poor medication use.  Denies any suicidal ideation.  Patient does express good future planning.  Does not appear to be responding to internal stimuli.  Has had passive thoughts of self-harm.  Discussed with patient restarting Prozac.  Should she develop any suicidal ideation she will seek emergent evaluation.  Plan: TSH WITH FREE T4 REFLEX, levothyroxine         (SYNTHROID/LEVOTHROID) 112 MCG tablet, CBC with        platelets and differential, Basic metabolic         panel  (Ca, Cl, CO2, Creat, Gluc, K, Na, BUN)          (F41.9) Anxiety  Comment:   Plan:     (F32.1) Major depressive disorder, single episode, moderate (H)  Comment: No active suicidal ideation.  Has had passive thoughts of self-harm in the past.  Will start Prozac 20 mg.  She has tolerated this in the past.  Follow-up in 1 month for recheck and discussion of symptoms.      BMI  Estimated body mass index is 31.76 kg/m  as calculated from the following:    Height as of this encounter: 1.549 m (5' 0.98\").    Weight as of this encounter: 76.2 kg (168 lb).   Depression Screening Follow Up         Follow Up Actions Taken  No suicidal ideation.  Patient will follow-up in 1 month for recheck of Prozac as well as thyroid medications.  If any thoughts of self-harm she will seek emergent evaluation    Otilio Beckwith is a 23 year old, presenting for the following health issues:  Thyroid Problem        4/25/2024    10:51 AM   Additional Questions   Roomed by Vero   Accompanied by BRENDA     Via the Health Maintenance questionnaire, the patient " "has reported the following services have been completed -Chlamydia, this information has been sent to the abstraction team.  History of Present Illness       Hypothyroidism:     Since last visit, patient describes the following symptoms::  Anxiety, Constipation, Depression, Dry skin, Fatigue and Hair loss    She eats 0-1 servings of fruits and vegetables daily.She consumes 1 sweetened beverage(s) daily.She exercises with enough effort to increase her heart rate 20 to 29 minutes per day.  She exercises with enough effort to increase her heart rate 5 days per week. She is missing 7 dose(s) of medications per week.      patient has history of congenital hypothyroidism.  Over the last few years intermittent use with levothyroxine.  Was on 112 mcg tablet.  She has been taking this more consistently over the last month.  She does have a history of depression and notes her mood has been slightly worse with poor control of hypothyroidism.  In the past she has been on Prozac and that seemed to work best for her.  She notes issues with irregularity of mood.  No active thoughts of self-harm.  She has passive thoughts at times without any suicidal plan.  Has not followed with a therapist for multiple years.  Denies any diarrhea, constipation.  Some hair loss issues.        8/9/2019    12:44 PM 4/27/2023    11:03 AM 4/25/2024    10:45 AM   PHQ   PHQ-9 Total Score 14 4 19   Q9: Thoughts of better off dead/self-harm past 2 weeks Not at all Not at all Several days   F/U: Thoughts of suicide or self-harm   Yes   F/U: Self harm-plan   No   F/U: Self-harm action   No   F/U: Safety concerns   No         Review of Systems  Constitutional, HEENT, cardiovascular, pulmonary, gi and gu systems are negative, except as otherwise noted.      Objective    /80   Pulse 71   Temp 97  F (36.1  C) (Tympanic)   Resp 16   Ht 1.549 m (5' 0.98\")   Wt 76.2 kg (168 lb)   LMP 04/08/2024 (Approximate)   SpO2 99%   BMI 31.76 kg/m    Body mass " index is 31.76 kg/m .  Physical Exam   GENERAL: alert and no distress  NECK: no adenopathy, no asymmetry, masses, or scars, and thyroid normal to palpation  RESP: lungs clear to auscultation - no rales, rhonchi or wheezes  CV: regular rate and rhythm, normal S1 S2, no S3 or S4, no murmur, click or rub, no peripheral edema  MS: no gross musculoskeletal defects noted, no edema  PSYCH: mentation appears normal, affect normal/bright,  denies any suicidal ideation.  No active plan.  Does not appear to be responding to internal stimuli.  Has future planning  with upcoming family wedding as well as a friend's trip to [place]          Signed Electronically by: Serjio Cowan PA-C

## 2024-04-25 NOTE — LETTER
May 13, 2024      A[place] Bl[place]  [address] [facility] [address]        Dear ,    We are writing to inform you of your test results.     Your TSH is significantly elevated at 266.  Your T4 at 0.69.  At this time continue with levothyroxine with plans to follow-up in 4 weeks for recheck.      Your creatinine (kidney function) minimally elevated at 1.0.  I will place a future lab to recheck this.   Normal hemoglobin without evidence of anemia.      If any further questions or concerns please reach out to clinic.      Please make future lab appointment to recheck kidney function as well as thyroid function.      Sincerely,   Serjio Cowan PA-C     Resulted Orders   TSH WITH FREE T4 REFLEX   Result Value Ref Range    .00 (H) 0.30 - 4.20 uIU/mL   Basic metabolic panel  (Ca, Cl, CO2, Creat, Gluc, K, Na, BUN)   Result Value Ref Range    Sodium 138 135 - 145 mmol/L      Comment:      Reference intervals for this test were updated on 09/26/2023 to more accurately reflect our healthy population. There may be differences in the flagging of prior results with similar values performed with this method. Interpretation of those prior results can be made in the context of the updated reference intervals.     Potassium 4.2 3.4 - 5.3 mmol/L    Chloride 103 98 - 107 mmol/L    Carbon Dioxide (CO2) 24 22 - 29 mmol/L    Anion Gap 11 7 - 15 mmol/L    Urea Nitrogen 13.3 6.0 - 20.0 mg/dL    Creatinine 1.00 (H) 0.51 - 0.95 mg/dL    GFR Estimate 80 >60 mL/min/1.73m2    Calcium 9.2 8.6 - 10.0 mg/dL    Glucose 85 70 - 99 mg/dL   CBC with platelets and differential   Result Value Ref Range    WBC Count 7.2 4.0 - 11.0 10e3/uL    RBC Count 3.70 (L) 3.80 - 5.20 10e6/uL    Hemoglobin 12.0 11.7 - 15.7 g/dL    Hematocrit 35.7 35.0 - 47.0 %    MCV 97 78 - 100 fL    MCH 32.4 26.5 - 33.0 pg    MCHC 33.6 31.5 - 36.5 g/dL    RDW 13.1 10.0 - 15.0 %    Platelet Count 305 150 - 450 10e3/uL    % Neutrophils 63 %    % Lymphocytes 28 %     % Monocytes 7 %    % Eosinophils 2 %    % Basophils 1 %    % Immature Granulocytes 0 %    Absolute Neutrophils 4.5 1.6 - 8.3 10e3/uL    Absolute Lymphocytes 2.0 0.8 - 5.3 10e3/uL    Absolute Monocytes 0.5 0.0 - 1.3 10e3/uL    Absolute Eosinophils 0.1 0.0 - 0.7 10e3/uL    Absolute Basophils 0.1 0.0 - 0.2 10e3/uL    Absolute Immature Granulocytes 0.0 <=0.4 10e3/uL   T4 free   Result Value Ref Range    Free T4 0.69 (L) 0.90 - 1.70 ng/dL       If you have any questions or concerns, please call the clinic at the number listed above.

## 2024-04-26 LAB
ANION GAP SERPL CALCULATED.3IONS-SCNC: 11 MMOL/L (ref 7–15)
BUN SERPL-MCNC: 13.3 MG/DL (ref 6–20)
CALCIUM SERPL-MCNC: 9.2 MG/DL (ref 8.6–10)
CHLORIDE SERPL-SCNC: 103 MMOL/L (ref 98–107)
CREAT SERPL-MCNC: 1 MG/DL (ref 0.51–0.95)
DEPRECATED HCO3 PLAS-SCNC: 24 MMOL/L (ref 22–29)
EGFRCR SERPLBLD CKD-EPI 2021: 80 ML/MIN/1.73M2
GLUCOSE SERPL-MCNC: 85 MG/DL (ref 70–99)
POTASSIUM SERPL-SCNC: 4.2 MMOL/L (ref 3.4–5.3)
SODIUM SERPL-SCNC: 138 MMOL/L (ref 135–145)
T4 FREE SERPL-MCNC: 0.69 NG/DL (ref 0.9–1.7)
TSH SERPL DL<=0.005 MIU/L-ACNC: 266 UIU/ML (ref 0.3–4.2)

## 2024-05-10 ENCOUNTER — PATIENT OUTREACH (OUTPATIENT)
Dept: OBGYN | Facility: CLINIC | Age: 24
End: 2024-05-10
Payer: COMMERCIAL

## 2024-05-10 DIAGNOSIS — R87.610 ATYPICAL SQUAMOUS CELLS OF UNDETERMINED SIGNIFICANCE (ASCUS) ON PAPANICOLAOU SMEAR OF CERVIX: Primary | ICD-10-CM

## 2024-05-10 NOTE — LETTER
June 17, 2024      Sisi Valladares  [address] [facility] [address]        Dear ,    This letter is to remind you that you are due for your follow-up Pap smear.    Please call [phone] to schedule your appointment at your earliest convenience.    If you have completed the appointment outside of the [facility] system, please have the records forwarded to our office. We will update your chart for your provider to review before your next annual wellness visit.     Thank you for choosing [facility]!      Sincerely,    Your [facility] Care Team

## 2024-05-28 ENCOUNTER — OFFICE VISIT (OUTPATIENT)
Dept: FAMILY MEDICINE | Facility: CLINIC | Age: 24
End: 2024-05-28
Payer: COMMERCIAL

## 2024-05-28 VITALS
SYSTOLIC BLOOD PRESSURE: 113 MMHG | TEMPERATURE: 97.4 F | RESPIRATION RATE: 12 BRPM | OXYGEN SATURATION: 100 % | HEIGHT: 61 IN | DIASTOLIC BLOOD PRESSURE: 77 MMHG | BODY MASS INDEX: 29.27 KG/M2 | WEIGHT: 155 LBS | HEART RATE: 65 BPM

## 2024-05-28 DIAGNOSIS — F32.1 MAJOR DEPRESSIVE DISORDER, SINGLE EPISODE, MODERATE (H): ICD-10-CM

## 2024-05-28 DIAGNOSIS — R79.89 ELEVATED SERUM CREATININE: ICD-10-CM

## 2024-05-28 DIAGNOSIS — E03.1 CONGENITAL HYPOTHYROIDISM WITHOUT GOITER: Primary | ICD-10-CM

## 2024-05-28 DIAGNOSIS — Z11.3 ROUTINE SCREENING FOR STI (SEXUALLY TRANSMITTED INFECTION): ICD-10-CM

## 2024-05-28 LAB
C TRACH DNA SPEC QL PROBE+SIG AMP: NEGATIVE
CLUE CELLS: ABNORMAL
CREAT SERPL-MCNC: 0.95 MG/DL (ref 0.51–0.95)
EGFRCR SERPLBLD CKD-EPI 2021: 85 ML/MIN/1.73M2
N GONORRHOEA DNA SPEC QL NAA+PROBE: NEGATIVE
T4 FREE SERPL-MCNC: 1.67 NG/DL (ref 0.9–1.7)
TRICHOMONAS, WET PREP: ABNORMAL
TSH SERPL DL<=0.005 MIU/L-ACNC: 15 UIU/ML (ref 0.3–4.2)
WBC'S/HIGH POWER FIELD, WET PREP: ABNORMAL
YEAST, WET PREP: ABNORMAL

## 2024-05-28 PROCEDURE — 86803 HEPATITIS C AB TEST: CPT | Performed by: PHYSICIAN ASSISTANT

## 2024-05-28 PROCEDURE — 87389 HIV-1 AG W/HIV-1&-2 AB AG IA: CPT | Performed by: PHYSICIAN ASSISTANT

## 2024-05-28 PROCEDURE — 99214 OFFICE O/P EST MOD 30 MIN: CPT | Performed by: PHYSICIAN ASSISTANT

## 2024-05-28 PROCEDURE — 36415 COLL VENOUS BLD VENIPUNCTURE: CPT | Performed by: PHYSICIAN ASSISTANT

## 2024-05-28 PROCEDURE — 82565 ASSAY OF CREATININE: CPT | Performed by: PHYSICIAN ASSISTANT

## 2024-05-28 PROCEDURE — 84443 ASSAY THYROID STIM HORMONE: CPT | Performed by: PHYSICIAN ASSISTANT

## 2024-05-28 PROCEDURE — 84439 ASSAY OF FREE THYROXINE: CPT | Performed by: PHYSICIAN ASSISTANT

## 2024-05-28 PROCEDURE — 87491 CHLMYD TRACH DNA AMP PROBE: CPT | Performed by: PHYSICIAN ASSISTANT

## 2024-05-28 PROCEDURE — 86780 TREPONEMA PALLIDUM: CPT | Performed by: PHYSICIAN ASSISTANT

## 2024-05-28 PROCEDURE — 87591 N.GONORRHOEAE DNA AMP PROB: CPT | Performed by: PHYSICIAN ASSISTANT

## 2024-05-28 PROCEDURE — 87210 SMEAR WET MOUNT SALINE/INK: CPT | Performed by: PHYSICIAN ASSISTANT

## 2024-05-28 RX ORDER — FLUOXETINE 40 MG/1
40 CAPSULE ORAL DAILY
Qty: 90 CAPSULE | Refills: 0 | Status: SHIPPED | OUTPATIENT
Start: 2024-05-28 | End: 2024-08-26

## 2024-05-28 ASSESSMENT — ANXIETY QUESTIONNAIRES
7. FEELING AFRAID AS IF SOMETHING AWFUL MIGHT HAPPEN: MORE THAN HALF THE DAYS
1. FEELING NERVOUS, ANXIOUS, OR ON EDGE: NEARLY EVERY DAY
GAD7 TOTAL SCORE: 13
IF YOU CHECKED OFF ANY PROBLEMS ON THIS QUESTIONNAIRE, HOW DIFFICULT HAVE THESE PROBLEMS MADE IT FOR YOU TO DO YOUR WORK, TAKE CARE OF THINGS AT HOME, OR GET ALONG WITH OTHER PEOPLE: SOMEWHAT DIFFICULT
6. BECOMING EASILY ANNOYED OR IRRITABLE: MORE THAN HALF THE DAYS
GAD7 TOTAL SCORE: 13
2. NOT BEING ABLE TO STOP OR CONTROL WORRYING: SEVERAL DAYS
5. BEING SO RESTLESS THAT IT IS HARD TO SIT STILL: SEVERAL DAYS
8. IF YOU CHECKED OFF ANY PROBLEMS, HOW DIFFICULT HAVE THESE MADE IT FOR YOU TO DO YOUR WORK, TAKE CARE OF THINGS AT HOME, OR GET ALONG WITH OTHER PEOPLE?: SOMEWHAT DIFFICULT
7. FEELING AFRAID AS IF SOMETHING AWFUL MIGHT HAPPEN: MORE THAN HALF THE DAYS
4. TROUBLE RELAXING: MORE THAN HALF THE DAYS
3. WORRYING TOO MUCH ABOUT DIFFERENT THINGS: MORE THAN HALF THE DAYS

## 2024-05-28 ASSESSMENT — PATIENT HEALTH QUESTIONNAIRE - PHQ9: SUM OF ALL RESPONSES TO PHQ QUESTIONS 1-9: 11

## 2024-05-28 ASSESSMENT — PAIN SCALES - GENERAL: PAINLEVEL: NO PAIN (1)

## 2024-05-28 NOTE — PROGRESS NOTES
Assessment & Plan     (E03.1) Congenital hypothyroidism without goiter  (primary encounter diagnosis)  Comment: History of hypothyroidism.  Currently on 112 mcg dose.  Discussed with patient rescreening of TSH and allergies been consistent with medication.  Patient has been more consistent with medication over the last month.  She is not having as much hypothyroid symptoms at this time.  Discussed with patient possibility of increasing medications based off of 4-week recheck versus future order for a 2-month follow-up.  Patient would like to wait at this time to ensure her accuracy/stability until recheck in July of TSH.  Plan: **TSH with free T4 reflex FUTURE 2mo          (F32.1) Major depressive disorder, single episode, moderate (H)  Comment: History of depression.  No thoughts of self-harm.  Prozac 20 mg has helped improve mood.  Patient would like to increase to 40 mg dose.  Patient will reach out in 1 to 3 months.  If symptoms are stable can provide further refills.  Plan: FLUoxetine (PROZAC) 40 MG capsule          (Z11.3) Routine screening for STI (sexually transmitted infection)  Comment: Discussed screening STI.  Patient denies any symptoms.  Plan: Chlamydia trachomatis/Neisseria gonorrhoeae by         PCR- VAGINAL SELF-SWAB, Hepatitis C antibody         [[id number]], HIV Antigen Antibody Combo [[id number]],        Treponema Abs w Reflex to RPR and Titer         [[id number]], Wet prep - Clinic Collect          (R79.89) Elevated serum creatinine  Comment: Previously elevated serum creatinine.  Future order has been released.         Otilio Beckwith is a 23 year old, presenting for the following health issues:  Recheck Medication        5/28/2024    10:07 AM   Additional Questions   Roomed by Antionette   Accompanied by self         5/28/2024    10:07 AM   Patient Reported Additional Medications   Patient reports taking the following new medications n/a     History of Present Illness       Mental Health Follow-up:   Patient presents to follow-up on Depression & Anxiety.Patient's depression since last visit has been:  Better  The patient is not having other symptoms associated with depression.  Patient's anxiety since last visit has been:  No change  The patient is having other symptoms associated with anxiety.  Any significant life events: No  Patient is feeling anxious or having panic attacks.  Patient has no concerns about alcohol or drug use.    Hypothyroidism:     Since last visit, patient describes the following symptoms::  Anxiety, Constipation, Depression, Dry skin and Fatigue    She eats 2-3 servings of fruits and vegetables daily.She consumes 1 sweetened beverage(s) daily.She exercises with enough effort to increase her heart rate 20 to 29 minutes per day.  She exercises with enough effort to increase her heart rate 5 days per week. She is missing 1 dose(s) of medications per week.     Patient with history of hypothyroidism.  Had been intermittently using levothyroxine.  Previously was on 112 mcg tablet.  Had been taking more consistently over the last month.  This is complicated with history of depression patient notes mood has been poor until starting levothyroxine consistently as well as Prozac over the last month.  No thoughts of self-harm.  She does feel mood has been more stable.  No diarrhea or constipation issues.      4/27/2023    11:03 AM 4/25/2024    10:45 AM 5/28/2024    10:14 AM   PHQ   PHQ-9 Total Score 4 19 11   Q9: Thoughts of better off dead/self-harm past 2 weeks Not at all Several days Not at all   F/U: Thoughts of suicide or self-harm  Yes    F/U: Self harm-plan  No    F/U: Self-harm action  No    F/U: Safety concerns  No          Review of Systems  Constitutional, neuro, ENT, endocrine, pulmonary, cardiac, gastrointestinal, genitourinary, musculoskeletal, integument and psychiatric systems are negative, except as otherwise noted.      Objective    /77   Pulse 65   Temp 97.4  F (36.3  C)  "(Tympanic)   Resp 12   Ht 1.549 m (5' 1\")   Wt 70.3 kg (155 lb)   LMP 05/26/2024 (Exact Date)   SpO2 100%   BMI 29.29 kg/m    Body mass index is 29.29 kg/m .  Physical Exam   GENERAL: alert and no distress  RESP: lungs clear to auscultation - no rales, rhonchi or wheezes  CV: regular rate and rhythm, normal S1 S2, no S3 or S4, no murmur, click or rub, no peripheral edema  MS: no gross musculoskeletal defects noted, no edema  PSYCH: mentation appears normal, affect normal/bright    No results found for any visits on 05/28/24.        Signed Electronically by: Serjio Cowan PA-C    "

## 2024-05-29 LAB
HCV AB SERPL QL IA: NONREACTIVE
HIV 1+2 AB+HIV1 P24 AG SERPL QL IA: NONREACTIVE
T PALLIDUM AB SER QL: NONREACTIVE

## 2024-07-12 NOTE — TELEPHONE ENCOUNTER
FYI to provider - Patient is lost to pap tracking follow-up. Attempts to contact pt have been made per reminder process and there has been no reply and/or no appt scheduled. Contact hx listed below.     6/1/23 ASCUS @ 23 yo. Plan: pap in 1 year  6/7/23 results sent via [facility]  6/12/23 left message  6/16/23 left message / [facility] viewed  5/10/24 Reminder [facility] [employer]  6/14/24 Reminder call --  full  6/17/24 Reminder call --  full. Reminder letter sent.  7/12/24 Lost to follow-up for pap tracking     Beatrice Maria RN BSN, Pap Tracking

## 2024-07-13 ENCOUNTER — HEALTH MAINTENANCE LETTER (OUTPATIENT)
Age: 24
End: 2024-07-13

## 2025-03-25 DIAGNOSIS — E03.1 CONGENITAL HYPOTHYROIDISM WITHOUT GOITER: ICD-10-CM

## 2025-03-25 DIAGNOSIS — F32.1 MAJOR DEPRESSIVE DISORDER, SINGLE EPISODE, MODERATE (H): ICD-10-CM

## 2025-03-25 NOTE — TELEPHONE ENCOUNTER
FYI - Patient has not had these medications filled with us in quite a while.  She called after the requests were sent over and said her doses have changed since last fill.  I asked her to follow up with clinic to discuss current dosing.    Thank you,  Carter Carey PharmD - Float Pharmacist, on behalf of [facility]

## 2025-03-25 NOTE — TELEPHONE ENCOUNTER
Clinic RN: Please investigate patient's chart or contact patient if the information cannot be found because patient should have run out of this medication on aug 2024 . Confirm patient is taking this medication as prescribed. Document findings and route refill encounter to provider for approval or denial.

## 2025-03-26 RX ORDER — LEVOTHYROXINE SODIUM 125 UG/1
125 TABLET ORAL
Qty: 90 TABLET | Refills: 0 | OUTPATIENT
Start: 2025-03-26

## 2025-03-26 RX ORDER — FLUOXETINE HYDROCHLORIDE 40 MG/1
40 CAPSULE ORAL DAILY
Qty: 30 CAPSULE | Refills: 0 | Status: SHIPPED | OUTPATIENT
Start: 2025-03-26

## 2025-03-26 RX ORDER — FLUOXETINE HYDROCHLORIDE 40 MG/1
40 CAPSULE ORAL DAILY
Qty: 90 CAPSULE | Refills: 0 | OUTPATIENT
Start: 2025-03-26

## 2025-03-26 RX ORDER — LEVOTHYROXINE SODIUM 112 UG/1
112 TABLET ORAL DAILY
Qty: 30 TABLET | Refills: 0 | Status: SHIPPED | OUTPATIENT
Start: 2025-03-26

## 2025-03-26 NOTE — TELEPHONE ENCOUNTER
Pt states she has been on these consistently.  Pt states her doses were changed when she was in the hospital of Feb 15, 2025 to fluoxetine 60 mg daily and levothyroxine 125 mcg daily.  I have pended these for refill.  Beatrice TRENTN, RN

## 2025-03-26 NOTE — TELEPHONE ENCOUNTER
Patient called back, she is scheduled with you on 4/2/25. She is asking for a refill to get her tott appointment. She will be out of medication by then.Kassidy Maldonado [facility]

## 2025-03-26 NOTE — TELEPHONE ENCOUNTER
Please contact patient.  These doses are different than what was prescribed previously by me.      Was there a different prescriber at some point in the last year?     Needs to be seen in clinic for refills with change in medication doses.     Serjio Cowan PA-C

## 2025-03-26 NOTE — TELEPHONE ENCOUNTER
Called and spoke with patient. Appointment made.She said that the medication was prescribed by Ajit Sheppard through [facility].Kassidy Maldonado [facility]

## 2025-03-26 NOTE — TELEPHONE ENCOUNTER
Please contact.     Unable to view information from [facility]. I can refill prior doses that were provided here otherwise needs to follow up with [facility].     Serjio Cowan PA-C

## 2025-07-19 ENCOUNTER — HEALTH MAINTENANCE LETTER (OUTPATIENT)
Age: 25
End: 2025-07-19

## 2025-09-03 ENCOUNTER — OFFICE VISIT (OUTPATIENT)
Dept: FAMILY MEDICINE | Facility: CLINIC | Age: 25
End: 2025-09-03

## 2025-09-03 VITALS
SYSTOLIC BLOOD PRESSURE: 116 MMHG | BODY MASS INDEX: 25.49 KG/M2 | HEART RATE: 68 BPM | OXYGEN SATURATION: 98 % | HEIGHT: 61 IN | WEIGHT: 135 LBS | DIASTOLIC BLOOD PRESSURE: 77 MMHG | TEMPERATURE: 98 F | RESPIRATION RATE: 16 BRPM

## 2025-09-03 DIAGNOSIS — Z11.59 NEED FOR HEPATITIS C SCREENING TEST: ICD-10-CM

## 2025-09-03 DIAGNOSIS — E03.1 CONGENITAL HYPOTHYROIDISM WITHOUT GOITER: Primary | ICD-10-CM

## 2025-09-03 DIAGNOSIS — M79.10 MUSCLE ACHE: ICD-10-CM

## 2025-09-03 DIAGNOSIS — R19.8 UMBILICUS DISCHARGE: ICD-10-CM

## 2025-09-03 DIAGNOSIS — F32.1 MAJOR DEPRESSIVE DISORDER, SINGLE EPISODE, MODERATE (H): ICD-10-CM

## 2025-09-03 DIAGNOSIS — Z11.3 SCREENING FOR STDS (SEXUALLY TRANSMITTED DISEASES): ICD-10-CM

## 2025-09-03 LAB
ANION GAP SERPL CALCULATED.3IONS-SCNC: 11 MMOL/L (ref 7–15)
BUN SERPL-MCNC: 16.5 MG/DL (ref 6–20)
C TRACH DNA SPEC QL PROBE+SIG AMP: NEGATIVE
CALCIUM SERPL-MCNC: 9.6 MG/DL (ref 8.8–10.4)
CHLORIDE SERPL-SCNC: 102 MMOL/L (ref 98–107)
CLUE CELLS: PRESENT
CREAT SERPL-MCNC: 1.09 MG/DL (ref 0.51–0.95)
EGFRCR SERPLBLD CKD-EPI 2021: 72 ML/MIN/1.73M2
GLUCOSE SERPL-MCNC: 88 MG/DL (ref 70–99)
HCO3 SERPL-SCNC: 26 MMOL/L (ref 22–29)
HCV AB SERPL QL IA: NONREACTIVE
HIV 1+2 AB+HIV1 P24 AG SERPL QL IA: NONREACTIVE
N GONORRHOEA DNA SPEC QL NAA+PROBE: NEGATIVE
POTASSIUM SERPL-SCNC: 4.2 MMOL/L (ref 3.4–5.3)
SODIUM SERPL-SCNC: 139 MMOL/L (ref 135–145)
SPECIMEN TYPE: NORMAL
T4 FREE SERPL-MCNC: 1.23 NG/DL (ref 0.9–1.7)
TRICHOMONAS, WET PREP: ABNORMAL
TSH SERPL DL<=0.005 MIU/L-ACNC: 123 UIU/ML (ref 0.3–4.2)
WBC'S/HIGH POWER FIELD, WET PREP: ABNORMAL
YEAST, WET PREP: ABNORMAL

## 2025-09-03 PROCEDURE — 86038 ANTINUCLEAR ANTIBODIES: CPT | Performed by: PHYSICIAN ASSISTANT

## 2025-09-03 PROCEDURE — 99214 OFFICE O/P EST MOD 30 MIN: CPT | Performed by: PHYSICIAN ASSISTANT

## 2025-09-03 PROCEDURE — 87389 HIV-1 AG W/HIV-1&-2 AB AG IA: CPT | Performed by: PHYSICIAN ASSISTANT

## 2025-09-03 PROCEDURE — 87591 N.GONORRHOEAE DNA AMP PROB: CPT | Performed by: PHYSICIAN ASSISTANT

## 2025-09-03 PROCEDURE — 84439 ASSAY OF FREE THYROXINE: CPT | Performed by: PHYSICIAN ASSISTANT

## 2025-09-03 PROCEDURE — 80048 BASIC METABOLIC PNL TOTAL CA: CPT | Performed by: PHYSICIAN ASSISTANT

## 2025-09-03 PROCEDURE — 87491 CHLMYD TRACH DNA AMP PROBE: CPT | Performed by: PHYSICIAN ASSISTANT

## 2025-09-03 PROCEDURE — 87210 SMEAR WET MOUNT SALINE/INK: CPT | Performed by: PHYSICIAN ASSISTANT

## 2025-09-03 PROCEDURE — 84443 ASSAY THYROID STIM HORMONE: CPT | Performed by: PHYSICIAN ASSISTANT

## 2025-09-03 PROCEDURE — 86803 HEPATITIS C AB TEST: CPT | Performed by: PHYSICIAN ASSISTANT

## 2025-09-03 PROCEDURE — 36415 COLL VENOUS BLD VENIPUNCTURE: CPT | Performed by: PHYSICIAN ASSISTANT

## 2025-09-03 RX ORDER — LAMOTRIGINE 25 MG/1
25 TABLET ORAL DAILY
COMMUNITY

## 2025-09-03 RX ORDER — LEVOTHYROXINE SODIUM 112 UG/1
112 TABLET ORAL DAILY
Qty: 90 TABLET | Refills: 1 | Status: SHIPPED | OUTPATIENT
Start: 2025-09-03

## 2025-09-03 RX ORDER — MUPIROCIN 2 %
OINTMENT (GRAM) TOPICAL 3 TIMES DAILY
Qty: 22 G | Refills: 0 | Status: SHIPPED | OUTPATIENT
Start: 2025-09-03 | End: 2025-09-10

## 2025-09-03 ASSESSMENT — PATIENT HEALTH QUESTIONNAIRE - PHQ9
10. IF YOU CHECKED OFF ANY PROBLEMS, HOW DIFFICULT HAVE THESE PROBLEMS MADE IT FOR YOU TO DO YOUR WORK, TAKE CARE OF THINGS AT HOME, OR GET ALONG WITH OTHER PEOPLE: SOMEWHAT DIFFICULT
SUM OF ALL RESPONSES TO PHQ QUESTIONS 1-9: 14
SUM OF ALL RESPONSES TO PHQ QUESTIONS 1-9: 14

## 2025-09-03 ASSESSMENT — PAIN SCALES - GENERAL: PAINLEVEL_OUTOF10: NO PAIN (0)

## 2025-09-04 ENCOUNTER — RESULTS FOLLOW-UP (OUTPATIENT)
Dept: FAMILY MEDICINE | Facility: CLINIC | Age: 25
End: 2025-09-04

## 2025-09-04 DIAGNOSIS — B96.89 BACTERIAL VAGINOSIS: Primary | ICD-10-CM

## 2025-09-04 DIAGNOSIS — R79.89 ELEVATED SERUM CREATININE: ICD-10-CM

## 2025-09-04 DIAGNOSIS — E03.1 CONGENITAL HYPOTHYROIDISM WITHOUT GOITER: ICD-10-CM

## 2025-09-04 DIAGNOSIS — N76.0 BACTERIAL VAGINOSIS: Primary | ICD-10-CM

## 2025-09-04 LAB — ANA SER QL IF: NEGATIVE

## 2025-09-04 RX ORDER — METRONIDAZOLE 500 MG/1
500 TABLET ORAL 2 TIMES DAILY
Qty: 14 TABLET | Refills: 0 | Status: SHIPPED | OUTPATIENT
Start: 2025-09-04 | End: 2025-09-11